# Patient Record
Sex: FEMALE | Race: OTHER | HISPANIC OR LATINO | URBAN - METROPOLITAN AREA
[De-identification: names, ages, dates, MRNs, and addresses within clinical notes are randomized per-mention and may not be internally consistent; named-entity substitution may affect disease eponyms.]

---

## 2018-03-24 ENCOUNTER — EMERGENCY (EMERGENCY)
Facility: HOSPITAL | Age: 38
LOS: 1 days | Discharge: HOME | End: 2018-03-24
Attending: EMERGENCY MEDICINE
Payer: MEDICAID

## 2018-03-24 VITALS
HEART RATE: 90 BPM | RESPIRATION RATE: 20 BRPM | SYSTOLIC BLOOD PRESSURE: 121 MMHG | OXYGEN SATURATION: 97 % | TEMPERATURE: 98 F | DIASTOLIC BLOOD PRESSURE: 80 MMHG

## 2018-03-24 PROCEDURE — 99284 EMERGENCY DEPT VISIT MOD MDM: CPT | Mod: 25

## 2018-03-24 RX ORDER — DIPHENHYDRAMINE HCL 50 MG
50 CAPSULE ORAL ONCE
Qty: 0 | Refills: 0 | Status: COMPLETED | OUTPATIENT
Start: 2018-03-24 | End: 2018-03-24

## 2018-03-24 RX ORDER — FAMOTIDINE 10 MG/ML
20 INJECTION INTRAVENOUS ONCE
Qty: 0 | Refills: 0 | Status: COMPLETED | OUTPATIENT
Start: 2018-03-24 | End: 2018-03-24

## 2018-03-24 NOTE — ED PROVIDER NOTE - ENMT, MLM
Airway patent, Nasal mucosa clear. Mouth with normal mucosa. Throat has no vesicles, no oropharyngeal exudates and uvula is midline. No oral involvement, no pharyngeal or uvula edema, no hoarse voice, tolerating secretions

## 2018-03-24 NOTE — ED PROVIDER NOTE - OBJECTIVE STATEMENT
This is a 38 year old female presenting to the ED c/o allergic reaction today. Endorses itchy rash to bilateral upper extremities and chest, unknown cause. Pt states she has experienced similar rash in the past at different areas of her body. Pt was evaluated at clinic 4 days ago and given shot of medicine but today sx returned, worse. Denies throat tightness, fever, chills, abd pain, n/v/d. Took Advil PTA. No further complaints at this time. , Matt, used to obtain history.

## 2018-03-25 VITALS
RESPIRATION RATE: 20 BRPM | OXYGEN SATURATION: 98 % | TEMPERATURE: 98 F | DIASTOLIC BLOOD PRESSURE: 75 MMHG | HEART RATE: 88 BPM | SYSTOLIC BLOOD PRESSURE: 120 MMHG

## 2018-03-25 PROCEDURE — 96375 TX/PRO/DX INJ NEW DRUG ADDON: CPT

## 2018-03-25 PROCEDURE — T1013: CPT

## 2018-03-25 PROCEDURE — 99284 EMERGENCY DEPT VISIT MOD MDM: CPT | Mod: 25

## 2018-03-25 PROCEDURE — 96374 THER/PROPH/DIAG INJ IV PUSH: CPT

## 2018-03-25 RX ORDER — DIPHENHYDRAMINE HCL 50 MG
1 CAPSULE ORAL
Qty: 8 | Refills: 0 | OUTPATIENT
Start: 2018-03-25 | End: 2018-03-26

## 2018-03-25 RX ADMIN — Medication 50 MILLIGRAM(S): at 00:54

## 2018-03-25 RX ADMIN — Medication 125 MILLIGRAM(S): at 00:53

## 2018-03-25 RX ADMIN — FAMOTIDINE 20 MILLIGRAM(S): 10 INJECTION INTRAVENOUS at 00:54

## 2018-03-25 NOTE — ED ADULT NURSE NOTE - OBJECTIVE STATEMENT
Received patient in CDU3R, a&ox4, able to make needs known. Patient reports of itchy rash to BUE and chest, unknown cause. Patient denies sob, throat tightness, itchiness and chest pain, n/v/d at this time. Medications given as MD order. Patient not in respiratory distress. To continue to monitor.

## 2018-06-13 ENCOUNTER — INPATIENT (INPATIENT)
Facility: HOSPITAL | Age: 38
LOS: 4 days | Discharge: HOME | DRG: 872 | End: 2018-06-18
Attending: HOSPITALIST | Admitting: HOSPITALIST
Payer: MEDICAID

## 2018-06-13 VITALS
DIASTOLIC BLOOD PRESSURE: 69 MMHG | HEIGHT: 61 IN | WEIGHT: 169.98 LBS | TEMPERATURE: 98 F | RESPIRATION RATE: 20 BRPM | SYSTOLIC BLOOD PRESSURE: 89 MMHG | HEART RATE: 126 BPM | OXYGEN SATURATION: 99 %

## 2018-06-13 DIAGNOSIS — N39.0 URINARY TRACT INFECTION, SITE NOT SPECIFIED: ICD-10-CM

## 2018-06-13 LAB
ALBUMIN SERPL ELPH-MCNC: 4.2 G/DL — SIGNIFICANT CHANGE UP (ref 3.3–5.2)
ALP SERPL-CCNC: 90 U/L — SIGNIFICANT CHANGE UP (ref 40–120)
ALT FLD-CCNC: 24 U/L — SIGNIFICANT CHANGE UP
ANION GAP SERPL CALC-SCNC: 15 MMOL/L — SIGNIFICANT CHANGE UP (ref 5–17)
APPEARANCE UR: CLEAR — SIGNIFICANT CHANGE UP
APTT BLD: 25.3 SEC — LOW (ref 27.5–37.4)
AST SERPL-CCNC: 32 U/L — HIGH
BACTERIA # UR AUTO: ABNORMAL
BILIRUB SERPL-MCNC: <0.2 MG/DL — LOW (ref 0.4–2)
BILIRUB UR-MCNC: ABNORMAL
BUN SERPL-MCNC: 15 MG/DL — SIGNIFICANT CHANGE UP (ref 8–20)
CALCIUM SERPL-MCNC: 9.4 MG/DL — SIGNIFICANT CHANGE UP (ref 8.6–10.2)
CHLORIDE SERPL-SCNC: 98 MMOL/L — SIGNIFICANT CHANGE UP (ref 98–107)
CO2 SERPL-SCNC: 25 MMOL/L — SIGNIFICANT CHANGE UP (ref 22–29)
COLOR SPEC: ABNORMAL
COMMENT - URINE: SIGNIFICANT CHANGE UP
CREAT SERPL-MCNC: 0.65 MG/DL — SIGNIFICANT CHANGE UP (ref 0.5–1.3)
DIFF PNL FLD: NEGATIVE — SIGNIFICANT CHANGE UP
EPI CELLS # UR: SIGNIFICANT CHANGE UP
GLUCOSE SERPL-MCNC: 125 MG/DL — HIGH (ref 70–115)
GLUCOSE UR QL: NEGATIVE MG/DL — SIGNIFICANT CHANGE UP
HCT VFR BLD CALC: 41.1 % — SIGNIFICANT CHANGE UP (ref 37–47)
HGB BLD-MCNC: 13.3 G/DL — SIGNIFICANT CHANGE UP (ref 12–16)
INR BLD: 1.03 RATIO — SIGNIFICANT CHANGE UP (ref 0.88–1.16)
KETONES UR-MCNC: NEGATIVE — SIGNIFICANT CHANGE UP
LACTATE BLDV-MCNC: 1.6 MMOL/L — SIGNIFICANT CHANGE UP (ref 0.5–2)
LEUKOCYTE ESTERASE UR-ACNC: ABNORMAL
LIDOCAIN IGE QN: 23 U/L — SIGNIFICANT CHANGE UP (ref 22–51)
LYMPHOCYTES # BLD AUTO: 6 % — LOW (ref 20–55)
MAGNESIUM SERPL-MCNC: 1.5 MG/DL — LOW (ref 1.6–2.6)
MCHC RBC-ENTMCNC: 30 PG — SIGNIFICANT CHANGE UP (ref 27–31)
MCHC RBC-ENTMCNC: 32.4 G/DL — SIGNIFICANT CHANGE UP (ref 32–36)
MCV RBC AUTO: 92.6 FL — SIGNIFICANT CHANGE UP (ref 81–99)
MONOCYTES NFR BLD AUTO: 2 % — LOW (ref 3–10)
NEUTROPHILS NFR BLD AUTO: 90 % — HIGH (ref 37–73)
NEUTS BAND # BLD: 2 % — SIGNIFICANT CHANGE UP (ref 0–8)
NITRITE UR-MCNC: POSITIVE
NT-PROBNP SERPL-SCNC: 19 PG/ML — SIGNIFICANT CHANGE UP (ref 0–300)
PH UR: 6.5 — SIGNIFICANT CHANGE UP (ref 5–8)
PLAT MORPH BLD: NORMAL — SIGNIFICANT CHANGE UP
PLATELET # BLD AUTO: 221 K/UL — SIGNIFICANT CHANGE UP (ref 150–400)
POTASSIUM SERPL-MCNC: 3.8 MMOL/L — SIGNIFICANT CHANGE UP (ref 3.5–5.3)
POTASSIUM SERPL-SCNC: 3.8 MMOL/L — SIGNIFICANT CHANGE UP (ref 3.5–5.3)
PROT SERPL-MCNC: 7.8 G/DL — SIGNIFICANT CHANGE UP (ref 6.6–8.7)
PROT UR-MCNC: 30 MG/DL
PROTHROM AB SERPL-ACNC: 11.3 SEC — SIGNIFICANT CHANGE UP (ref 9.8–12.7)
RBC # BLD: 4.44 M/UL — SIGNIFICANT CHANGE UP (ref 4.4–5.2)
RBC # FLD: 13.9 % — SIGNIFICANT CHANGE UP (ref 11–15.6)
RBC BLD AUTO: NORMAL — SIGNIFICANT CHANGE UP
RBC CASTS # UR COMP ASSIST: SIGNIFICANT CHANGE UP /HPF (ref 0–4)
SODIUM SERPL-SCNC: 138 MMOL/L — SIGNIFICANT CHANGE UP (ref 135–145)
SP GR SPEC: 1.01 — SIGNIFICANT CHANGE UP (ref 1.01–1.02)
TROPONIN T SERPL-MCNC: <0.01 NG/ML — SIGNIFICANT CHANGE UP (ref 0–0.06)
TSH SERPL-MCNC: 2.58 UIU/ML — SIGNIFICANT CHANGE UP (ref 0.27–4.2)
UROBILINOGEN FLD QL: 8 MG/DL
WBC # BLD: 8.9 K/UL — SIGNIFICANT CHANGE UP (ref 4.8–10.8)
WBC # FLD AUTO: 8.9 K/UL — SIGNIFICANT CHANGE UP (ref 4.8–10.8)
WBC UR QL: ABNORMAL

## 2018-06-13 PROCEDURE — 71275 CT ANGIOGRAPHY CHEST: CPT | Mod: 26

## 2018-06-13 PROCEDURE — 71045 X-RAY EXAM CHEST 1 VIEW: CPT | Mod: 26

## 2018-06-13 PROCEDURE — 99285 EMERGENCY DEPT VISIT HI MDM: CPT

## 2018-06-13 RX ORDER — IBUPROFEN 200 MG
600 TABLET ORAL ONCE
Qty: 0 | Refills: 0 | Status: COMPLETED | OUTPATIENT
Start: 2018-06-13 | End: 2018-06-13

## 2018-06-13 RX ORDER — AZITHROMYCIN 500 MG/1
500 TABLET, FILM COATED ORAL ONCE
Qty: 0 | Refills: 0 | Status: COMPLETED | OUTPATIENT
Start: 2018-06-13 | End: 2018-06-13

## 2018-06-13 RX ORDER — ACETAMINOPHEN 500 MG
650 TABLET ORAL ONCE
Qty: 0 | Refills: 0 | Status: COMPLETED | OUTPATIENT
Start: 2018-06-13 | End: 2018-06-13

## 2018-06-13 RX ORDER — CEFTRIAXONE 500 MG/1
1 INJECTION, POWDER, FOR SOLUTION INTRAMUSCULAR; INTRAVENOUS ONCE
Qty: 0 | Refills: 0 | Status: COMPLETED | OUTPATIENT
Start: 2018-06-13 | End: 2018-06-13

## 2018-06-13 RX ORDER — SODIUM CHLORIDE 9 MG/ML
2500 INJECTION INTRAMUSCULAR; INTRAVENOUS; SUBCUTANEOUS ONCE
Qty: 0 | Refills: 0 | Status: COMPLETED | OUTPATIENT
Start: 2018-06-13 | End: 2018-06-13

## 2018-06-13 RX ADMIN — AZITHROMYCIN 255 MILLIGRAM(S): 500 TABLET, FILM COATED ORAL at 18:17

## 2018-06-13 RX ADMIN — SODIUM CHLORIDE 2500 MILLILITER(S): 9 INJECTION INTRAMUSCULAR; INTRAVENOUS; SUBCUTANEOUS at 17:50

## 2018-06-13 RX ADMIN — Medication 600 MILLIGRAM(S): at 19:26

## 2018-06-13 RX ADMIN — Medication 650 MILLIGRAM(S): at 18:56

## 2018-06-13 RX ADMIN — Medication 600 MILLIGRAM(S): at 18:56

## 2018-06-13 RX ADMIN — CEFTRIAXONE 100 GRAM(S): 500 INJECTION, POWDER, FOR SOLUTION INTRAMUSCULAR; INTRAVENOUS at 18:17

## 2018-06-13 NOTE — ED ADULT NURSE NOTE - OBJECTIVE STATEMENT
pt c/o chest pain and back pain since today pt states that it reoccurred from three years ago. pt presently awake and alert resp even and unlabored. pt is tachycardic on monitor.

## 2018-06-13 NOTE — ED ADULT NURSE REASSESSMENT NOTE - NS ED NURSE REASSESS COMMENT FT1
pt returned from CT scan, pt c/o of being SOB with pain when breathing in. pt has family at bedside. NS hanging through patent IV.  Vital signs recorded

## 2018-06-13 NOTE — ED ADULT NURSE REASSESSMENT NOTE - NS ED NURSE REASSESS COMMENT FT1
assumed care of patient. pt alert and oriented x4. pt c/o right chest pain 3/10. pt has NS running through right IV AC. VSS, afebrile. Pt resting in bed comfortably will continue to monitor

## 2018-06-13 NOTE — ED ADULT TRIAGE NOTE - CHIEF COMPLAINT QUOTE
Patient states that she is having midsternal chest pain that started today. Patient states that the pain is non radiating and sharp. Patient c/o some SOB and back pain. Sent from clinic.

## 2018-06-13 NOTE — ED PROVIDER NOTE - MUSCULOSKELETAL, MLM
Spine appears normal, range of motion is not limited, no muscle or joint tenderness Spine appears normal, range of motion is not limited, no muscle or joint tenderness No cva ttp

## 2018-06-13 NOTE — ED PROVIDER NOTE - CONSTITUTIONAL, MLM
normal... Well appearing, well nourished, awake, alert, oriented to person, place, time/situation and in no apparent distress. Well appearing, well nourished, awake, alert, oriented to person, place, time/situation. Febrile.

## 2018-06-13 NOTE — ED PROVIDER NOTE - CARE PLAN
Principal Discharge DX:	UTI (urinary tract infection)  Secondary Diagnosis:	SIRS (systemic inflammatory response syndrome)

## 2018-06-13 NOTE — ED PROVIDER NOTE - OBJECTIVE STATEMENT
37 y/o female pt with PMHx bronchitis presents to the ED c/o chest pain. Pt describes chest pain as sharp. Associated symptoms include back pain, difficulty breathing, and cough. Denies fever, burning urination, vomiting, or abd pain. No further complaints at this time. 37 y/o female pt with PMHx bronchitis presents to the ED c/o chest pain. Pt describes chest pain as sharp. Associated symptoms include back pain, difficulty breathing, and cough. Denies fever, burning urination, vomiting, or abd pain. No further complaints at this time.  Patient found to be tachy and febrile, code sepsis initiated

## 2018-06-14 LAB
ALBUMIN SERPL ELPH-MCNC: 3 G/DL — LOW (ref 3.3–5.2)
ALP SERPL-CCNC: 62 U/L — SIGNIFICANT CHANGE UP (ref 40–120)
ALT FLD-CCNC: 31 U/L — SIGNIFICANT CHANGE UP
ANION GAP SERPL CALC-SCNC: 10 MMOL/L — SIGNIFICANT CHANGE UP (ref 5–17)
AST SERPL-CCNC: 29 U/L — SIGNIFICANT CHANGE UP
BILIRUB SERPL-MCNC: 0.3 MG/DL — LOW (ref 0.4–2)
BUN SERPL-MCNC: 10 MG/DL — SIGNIFICANT CHANGE UP (ref 8–20)
CALCIUM SERPL-MCNC: 7.5 MG/DL — LOW (ref 8.6–10.2)
CHLORIDE SERPL-SCNC: 105 MMOL/L — SIGNIFICANT CHANGE UP (ref 98–107)
CO2 SERPL-SCNC: 22 MMOL/L — SIGNIFICANT CHANGE UP (ref 22–29)
CREAT SERPL-MCNC: 0.59 MG/DL — SIGNIFICANT CHANGE UP (ref 0.5–1.3)
CULTURE RESULTS: SIGNIFICANT CHANGE UP
GLUCOSE SERPL-MCNC: 118 MG/DL — HIGH (ref 70–115)
POTASSIUM SERPL-MCNC: 3.9 MMOL/L — SIGNIFICANT CHANGE UP (ref 3.5–5.3)
POTASSIUM SERPL-SCNC: 3.9 MMOL/L — SIGNIFICANT CHANGE UP (ref 3.5–5.3)
PROT SERPL-MCNC: 5.6 G/DL — LOW (ref 6.6–8.7)
SODIUM SERPL-SCNC: 137 MMOL/L — SIGNIFICANT CHANGE UP (ref 135–145)
SPECIMEN SOURCE: SIGNIFICANT CHANGE UP
TROPONIN T SERPL-MCNC: <0.01 NG/ML — SIGNIFICANT CHANGE UP (ref 0–0.06)

## 2018-06-14 PROCEDURE — 74176 CT ABD & PELVIS W/O CONTRAST: CPT | Mod: 26

## 2018-06-14 RX ORDER — ONDANSETRON 8 MG/1
4 TABLET, FILM COATED ORAL
Qty: 0 | Refills: 0 | Status: DISCONTINUED | OUTPATIENT
Start: 2018-06-14 | End: 2018-06-17

## 2018-06-14 RX ORDER — ACETAMINOPHEN 500 MG
650 TABLET ORAL EVERY 6 HOURS
Qty: 0 | Refills: 0 | Status: DISCONTINUED | OUTPATIENT
Start: 2018-06-14 | End: 2018-06-17

## 2018-06-14 RX ORDER — SODIUM CHLORIDE 9 MG/ML
1000 INJECTION INTRAMUSCULAR; INTRAVENOUS; SUBCUTANEOUS
Qty: 0 | Refills: 0 | Status: DISCONTINUED | OUTPATIENT
Start: 2018-06-14 | End: 2018-06-14

## 2018-06-14 RX ORDER — SODIUM CHLORIDE 9 MG/ML
1000 INJECTION, SOLUTION INTRAVENOUS
Qty: 0 | Refills: 0 | Status: DISCONTINUED | OUTPATIENT
Start: 2018-06-14 | End: 2018-06-15

## 2018-06-14 RX ORDER — SODIUM CHLORIDE 9 MG/ML
1000 INJECTION INTRAMUSCULAR; INTRAVENOUS; SUBCUTANEOUS ONCE
Qty: 0 | Refills: 0 | Status: COMPLETED | OUTPATIENT
Start: 2018-06-14 | End: 2018-06-14

## 2018-06-14 RX ORDER — SACCHAROMYCES BOULARDII 250 MG
250 POWDER IN PACKET (EA) ORAL
Qty: 0 | Refills: 0 | Status: DISCONTINUED | OUTPATIENT
Start: 2018-06-14 | End: 2018-06-15

## 2018-06-14 RX ORDER — LANOLIN ALCOHOL/MO/W.PET/CERES
3 CREAM (GRAM) TOPICAL AT BEDTIME
Qty: 0 | Refills: 0 | Status: DISCONTINUED | OUTPATIENT
Start: 2018-06-14 | End: 2018-06-15

## 2018-06-14 RX ORDER — MAGNESIUM SULFATE 500 MG/ML
2 VIAL (ML) INJECTION ONCE
Qty: 0 | Refills: 0 | Status: COMPLETED | OUTPATIENT
Start: 2018-06-14 | End: 2018-06-14

## 2018-06-14 RX ORDER — CEFTRIAXONE 500 MG/1
1 INJECTION, POWDER, FOR SOLUTION INTRAMUSCULAR; INTRAVENOUS EVERY 24 HOURS
Qty: 0 | Refills: 0 | Status: DISCONTINUED | OUTPATIENT
Start: 2018-06-14 | End: 2018-06-15

## 2018-06-14 RX ORDER — ALBUTEROL 90 UG/1
2.5 AEROSOL, METERED ORAL EVERY 4 HOURS
Qty: 0 | Refills: 0 | Status: DISCONTINUED | OUTPATIENT
Start: 2018-06-14 | End: 2018-06-14

## 2018-06-14 RX ORDER — ALBUTEROL 90 UG/1
2.5 AEROSOL, METERED ORAL EVERY 6 HOURS
Qty: 0 | Refills: 0 | Status: DISCONTINUED | OUTPATIENT
Start: 2018-06-14 | End: 2018-06-18

## 2018-06-14 RX ORDER — KETOROLAC TROMETHAMINE 30 MG/ML
15 SYRINGE (ML) INJECTION ONCE
Qty: 0 | Refills: 0 | Status: DISCONTINUED | OUTPATIENT
Start: 2018-06-14 | End: 2018-06-14

## 2018-06-14 RX ORDER — ALBUTEROL 90 UG/1
2 AEROSOL, METERED ORAL
Qty: 0 | Refills: 0 | COMMUNITY

## 2018-06-14 RX ADMIN — Medication 250 MILLIGRAM(S): at 17:57

## 2018-06-14 RX ADMIN — Medication 250 MILLIGRAM(S): at 05:18

## 2018-06-14 RX ADMIN — Medication 3 MILLIGRAM(S): at 21:49

## 2018-06-14 RX ADMIN — Medication 50 GRAM(S): at 03:30

## 2018-06-14 RX ADMIN — Medication 650 MILLIGRAM(S): at 08:08

## 2018-06-14 RX ADMIN — Medication 15 MILLIGRAM(S): at 05:38

## 2018-06-14 RX ADMIN — Medication 650 MILLIGRAM(S): at 21:49

## 2018-06-14 RX ADMIN — Medication 650 MILLIGRAM(S): at 14:55

## 2018-06-14 RX ADMIN — ONDANSETRON 4 MILLIGRAM(S): 8 TABLET, FILM COATED ORAL at 21:49

## 2018-06-14 RX ADMIN — ONDANSETRON 4 MILLIGRAM(S): 8 TABLET, FILM COATED ORAL at 17:57

## 2018-06-14 RX ADMIN — CEFTRIAXONE 100 GRAM(S): 500 INJECTION, POWDER, FOR SOLUTION INTRAMUSCULAR; INTRAVENOUS at 17:57

## 2018-06-14 RX ADMIN — SODIUM CHLORIDE 2000 MILLILITER(S): 9 INJECTION INTRAMUSCULAR; INTRAVENOUS; SUBCUTANEOUS at 05:36

## 2018-06-14 RX ADMIN — Medication 650 MILLIGRAM(S): at 22:49

## 2018-06-14 RX ADMIN — Medication 650 MILLIGRAM(S): at 08:50

## 2018-06-14 RX ADMIN — SODIUM CHLORIDE 200 MILLILITER(S): 9 INJECTION, SOLUTION INTRAVENOUS at 17:57

## 2018-06-14 NOTE — ED ADULT NURSE REASSESSMENT NOTE - NS ED NURSE REASSESS COMMENT FT1
Spoke with Dr. Tobar aware of patient vital signs and assessment findings will come to assess patient; no additional fluid resuscitation at this time.

## 2018-06-14 NOTE — ED ADULT NURSE REASSESSMENT NOTE - NS ED NURSE REASSESS COMMENT FT1
PAtient received awake alert and oriented x3; Wolof speaking. offers no complaints. hypotensive/tachycardiac; patient asymptomatic. moves all extremities. slight lower extremity edema; non pitting. fluids in progress. will call md regarding bp.

## 2018-06-14 NOTE — PROGRESS NOTE ADULT - SUBJECTIVE AND OBJECTIVE BOX
INTERVAL HPI/OVERNIGHT EVENTS:  Patient is a 38y Female admitted with chief complaint of Chest pain, back pain & abdominal pain (14 Jun 2018 01:21)    Patient seen and examined at bedside, No acute overnight events. Patient ambulating, tolerating PO, voiding & stooling without any difficulties.      ROS: denies fever, chills, chest pain, SOB, abdominal pain, diarrhea, calf pain.      Allergies    No Known Drug Allergies  pork (Rash)    Intolerances    Vital Signs Last 24 Hrs  T(C): 36.6 (14 Jun 2018 05:41), Max: 38.4 (13 Jun 2018 17:41)  T(F): 97.9 (14 Jun 2018 05:41), Max: 101.2 (13 Jun 2018 17:41)  HR: 107 (14 Jun 2018 05:41) (107 - 132)  BP: 117/68 (14 Jun 2018 05:41) (89/67 - 117/68)  BP(mean): --  RR: 18 (14 Jun 2018 05:41) (18 - 20)  SpO2: 97% (14 Jun 2018 05:41) (96% - 100%)    Physical Exam:   GENERAL: well-groomed, well-developed, NAD  HEENT: head NC/AT; EOM intact   RESPIRATORY: CTA B/L, no wheezing, rales, rhonchi or rubs  CARDIOVASCULAR: S1&S2, RRR, no murmurs or gallops  ABDOMEN: soft, B/L flank tenderness (L>R), non-distended, BS+, no hernias, no hepatosplenomegaly, B/L CVA tenderness (L>R)  MUSCULOSKELETAL: n no clubbing, cyanosis or edema of extremities, no calf tenderness   SKIN: No rashes, bruises or scars   NEUROLOGIC: AA&O X3     Labs:                        13.3   8.9   )-----------( 221      ( 13 Jun 2018 17:43 )             41.1   06-13    138  |  98  |  15.0  ----------------------------<  125<H>  3.8   |  25.0  |  0.65    Ca    9.4      13 Jun 2018 17:43  Mg     1.5     06-13    TPro  7.8  /  Alb  4.2  /  TBili  <0.2<L>  /  DBili  x   /  AST  32<H>  /  ALT  24  /  AlkPhos  90  06-13    PT/INR - ( 13 Jun 2018 17:43 )   PT: 11.3 sec;   INR: 1.03 ratio         PTT - ( 13 Jun 2018 17:43 )  PTT:25.3 sec  CAPILLARY BLOOD GLUCOSE        Radiology:  < from: CT Renal Stone Hunt (06.14.18 @ 00:21) >  IMPRESSION:         Images are somewhat degraded by artifact from patient motion.      Cannot exclude renal collecting system stones as the exam was performed   after   IV contrast administration and there is dense contrast in the urinary   tract   collecting system. No hydronephrosis or hydroureter.      Nonspecific small groin and external iliac chain lymph nodes.      Colonic diverticulosis.    < end of copied text >      Medications:  MEDICATIONS  (STANDING):  cefTRIAXone   IVPB 1 Gram(s) IV Intermittent every 24 hours  saccharomyces boulardii 250 milliGRAM(s) Oral two times a day  sodium chloride 0.9%. 1000 milliLiter(s) (125 mL/Hr) IV Continuous <Continuous>    MEDICATIONS  (PRN):  acetaminophen   Tablet 650 milliGRAM(s) Oral every 6 hours PRN For Temp greater than 38 C (100.4 F)  acetaminophen   Tablet. 650 milliGRAM(s) Oral every 6 hours PRN Moderate Pain (4 - 6)  ALBUTerol    0.083% 2.5 milliGRAM(s) Nebulizer every 6 hours PRN Shortness of Breath and/or Wheezing

## 2018-06-14 NOTE — ED ADULT NURSE REASSESSMENT NOTE - NS ED NURSE REASSESS COMMENT FT1
pt A&ox4 c/o headache .. medicated as ordered will continue to monitor, resp even and unlabored no distress noted, cardiac monitor in place, VSS as documented, pt ambulated without difficulty, will continue to monitor

## 2018-06-14 NOTE — H&P ADULT - HISTORY OF PRESENT ILLNESS
39 y/o F w/ PMH bronchitis & HLD presents w/ chest pain, back pain & abdominal pain. Pt had chest pain this morning so she went to the clinic to get checked out. She was subsequently sent to ED for further evaluation. She described the pain as sharp, epigastric, 8/10, intermittent, non-radiating, exacerbated w/ movement, alleviated w/ rest, non-pruritic & associated w/ B/L back pain (L>R), B/L flank pain (L>R), N/V (non-bloody nonbilious) & dysuria. Patient denies of headache, fever, chills, palpitations, SOB, constipation, diarrhea, hematuria, recent travel, LE swelling, calf tenderness or sick contacts. No similar episodes in the past.

## 2018-06-14 NOTE — H&P ADULT - NSHPPHYSICALEXAM_GEN_ALL_CORE
T(F): 101.2  HR: 132  BP: 116/71  RR: 18  SpO2: 100% RA    Physical Exam:   GENERAL: well-groomed, well-developed, NAD  HEENT: head NC/AT; EOM intact, PERRLA, conjunctiva & sclera clear; hearing grossly intact ; no nasal congestion or discharge, no sinus tenderness, no tonsillar erythema or exudates, dry mucous membranes   NECK: supple, no JVD, no thyromegaly  RESPIRATORY: CTA B/L, no wheezing, rales, rhonchi or rubs  CARDIOVASCULAR: S1&S2, RRR, no murmurs or gallops  ABDOMEN: soft, B/L flank tenderness (L>R), non-distended, BS+, no hernias, no hepatosplenomegaly, B/L CVA tenderness (L>R)  MUSCULOSKELETAL: no muscle atrophy, no clubbing, cyanosis or edema of extremities, no calf tenderness   LYMPH: no lymphadenopathy  VASCULAR: peripheral pulses 2+, capillary refill < 2 seconds, no varicose veins   SKIN: No rashes, bruises or scars   NEUROLOGIC: AA&O X3, CN2-12 intact w/ no focal deficits, no sensory loss, motor Strength 5/5 in UE & LE B/L

## 2018-06-14 NOTE — H&P ADULT - NSHPREVIEWOFSYSTEMS_GEN_ALL_CORE
positive: chest pain, B/L back pain (L>R), B/L flank pain (L>R), N/V (non-bloody nonbilious) & dysuria.  negative: headache, fever, chills, palpitations, SOB, constipation, diarrhea, hematuria, recent travel, LE swelling, calf tenderness or sick contacts.

## 2018-06-14 NOTE — H&P ADULT - ASSESSMENT
39 y/o F w/ PMH bronchitis & HLD presents w/ chest pain, back pain & abdominal pain admitted for pyelonephritis. 39 y/o F w/ PMH bronchitis & HLD presents w/ chest pain, back pain & abdominal pain admitted for pyelonephritis. CTA negative for PE.     Pyelonephritis  >Admit to medicine-resident service under Dr. Mares  >Bed: any  >Diet: DASH/TLC  >Activity: ambulate as tolerated  >Nursing: vitals per routine  >IV fluids: s/p 2.5 L NS in ED; now on NS @ 125 cc/hr  >Antibiotics: ceftriaxone w/ florastor  >Labs: CBC, CMP, PT/INR, PTT, Mg, Phos, UA, blood & urine cultures  >Imaging: chest CTA negative for PE, abdominal CT checking for renal stones pending  >Consults: may consider urology consult if there's hydronephrosis or large renal stones    Bronchitis  >stable  >albuterol Q6H PRN bronchospasm and/or SOB    DVT prophylaxis   >VCD boots 39 y/o F w/ PMH bronchitis & HLD presents w/ chest pain, back pain & abdominal pain admitted for pyelonephritis. CTA negative for PE.     Pyelonephritis  >Admit to medicine-resident service under Dr. Mares  >Bed: any  >Diet: DASH/TLC  >Activity: ambulate as tolerated  >Nursing: vitals per routine  >IV fluids: s/p 2.5 L NS in ED; now on NS @ 125 cc/hr  >Antibiotics: ceftriaxone w/ florastor  >Labs: CBC, CMP, PT/INR, PTT, Mg, Phos, UA, blood & urine cultures  >Imaging: chest CTA negative for PE, abdominal CT checking for renal stones pending  >Consults: may consider urology consult if there's hydronephrosis or large renal stones    Hypomagnesemia  >Mg 1.5; repleted  >f/u AM labs     Elevated LFTs  >f/u  AM labs   >will peruse further workup if LFTs remain persistently elevated      Bronchitis  >stable  >albuterol Q6H PRN bronchospasm and/or SOB    HLD  >diet controlled; c/w DASH/TLC    DVT prophylaxis   >VCD boots

## 2018-06-14 NOTE — PROGRESS NOTE ADULT - ASSESSMENT
37 y/o F w/ PMH bronchitis & HLD presents w/ chest pain, back pain & abdominal pain admitted for pyelonephritis. CTA negative for PE.     Pyelonephritis  >IV fluids: s/p 3.5 L NS in ED; now on NS @ 125 cc/hr  >c/w ceftriaxone w/ florastor  >f/u: blood & urine cultures  >chest CTA negative for PE  >abdominal CT negative for obstructing stones or collections but study limited by previous use of contrast; may need to repeat imaging if patient fail to respond to current therapy    Hypomagnesemia  >Mg 1.5; repleted  >AM labs pending    Elevated LFTs  >AM labs pending    >will peruse further workup if LFTs remain persistently elevated      Bronchitis  >stable  >albuterol Q6H PRN bronchospasm and/or SOB    HLD  >diet controlled; c/w DASH/TLC    Colonic diverticulosis  >incidental finding; asymptomatic   >outpatient f/u w/ GI if symptomatic     DVT prophylaxis   >VCD boots

## 2018-06-14 NOTE — ED ADULT NURSE REASSESSMENT NOTE - NS ED NURSE REASSESS COMMENT FT1
pt status unchanged, refer to flowsheet and chart, pt safety maintained, pt hemodynamically stable, report given, pending transfer

## 2018-06-15 DIAGNOSIS — T78.3XXA ANGIONEUROTIC EDEMA, INITIAL ENCOUNTER: ICD-10-CM

## 2018-06-15 DIAGNOSIS — J06.9 ACUTE UPPER RESPIRATORY INFECTION, UNSPECIFIED: ICD-10-CM

## 2018-06-15 DIAGNOSIS — D64.9 ANEMIA, UNSPECIFIED: ICD-10-CM

## 2018-06-15 DIAGNOSIS — J45.909 UNSPECIFIED ASTHMA, UNCOMPLICATED: ICD-10-CM

## 2018-06-15 DIAGNOSIS — N12 TUBULO-INTERSTITIAL NEPHRITIS, NOT SPECIFIED AS ACUTE OR CHRONIC: ICD-10-CM

## 2018-06-15 DIAGNOSIS — F32.9 MAJOR DEPRESSIVE DISORDER, SINGLE EPISODE, UNSPECIFIED: ICD-10-CM

## 2018-06-15 DIAGNOSIS — Z29.9 ENCOUNTER FOR PROPHYLACTIC MEASURES, UNSPECIFIED: ICD-10-CM

## 2018-06-15 DIAGNOSIS — R51 HEADACHE: ICD-10-CM

## 2018-06-15 DIAGNOSIS — E83.51 HYPOCALCEMIA: ICD-10-CM

## 2018-06-15 DIAGNOSIS — E83.39 OTHER DISORDERS OF PHOSPHORUS METABOLISM: ICD-10-CM

## 2018-06-15 LAB
ALBUMIN SERPL ELPH-MCNC: 2.8 G/DL — LOW (ref 3.3–5.2)
ALP SERPL-CCNC: 70 U/L — SIGNIFICANT CHANGE UP (ref 40–120)
ALT FLD-CCNC: 43 U/L — HIGH
ANION GAP SERPL CALC-SCNC: 11 MMOL/L — SIGNIFICANT CHANGE UP (ref 5–17)
AST SERPL-CCNC: 33 U/L — HIGH
BILIRUB SERPL-MCNC: <0.2 MG/DL — LOW (ref 0.4–2)
BUN SERPL-MCNC: 4 MG/DL — LOW (ref 8–20)
CALCIUM SERPL-MCNC: 7.5 MG/DL — LOW (ref 8.6–10.2)
CHLORIDE SERPL-SCNC: 109 MMOL/L — HIGH (ref 98–107)
CO2 SERPL-SCNC: 21 MMOL/L — LOW (ref 22–29)
CREAT SERPL-MCNC: 0.4 MG/DL — LOW (ref 0.5–1.3)
EOSINOPHIL # BLD AUTO: 0.9 K/UL — HIGH (ref 0–0.5)
EOSINOPHIL NFR BLD AUTO: 6.9 % — HIGH (ref 0–6)
GLUCOSE SERPL-MCNC: 121 MG/DL — HIGH (ref 70–115)
HCT VFR BLD CALC: 30.2 % — LOW (ref 37–47)
HGB BLD-MCNC: 9.9 G/DL — LOW (ref 12–16)
LYMPHOCYTES # BLD AUTO: 0.8 K/UL — LOW (ref 1–4.8)
LYMPHOCYTES # BLD AUTO: 6.2 % — LOW (ref 20–55)
MAGNESIUM SERPL-MCNC: 2.3 MG/DL — SIGNIFICANT CHANGE UP (ref 1.8–2.6)
MCHC RBC-ENTMCNC: 29.6 PG — SIGNIFICANT CHANGE UP (ref 27–31)
MCHC RBC-ENTMCNC: 32.8 G/DL — SIGNIFICANT CHANGE UP (ref 32–36)
MCV RBC AUTO: 90.1 FL — SIGNIFICANT CHANGE UP (ref 81–99)
MONOCYTES # BLD AUTO: 0.7 K/UL — SIGNIFICANT CHANGE UP (ref 0–0.8)
MONOCYTES NFR BLD AUTO: 5.3 % — SIGNIFICANT CHANGE UP (ref 3–10)
NEUTROPHILS # BLD AUTO: 10.2 K/UL — HIGH (ref 1.8–8)
NEUTROPHILS NFR BLD AUTO: 81.4 % — HIGH (ref 37–73)
PHOSPHATE SERPL-MCNC: 1.8 MG/DL — LOW (ref 2.4–4.7)
PLATELET # BLD AUTO: 151 K/UL — SIGNIFICANT CHANGE UP (ref 150–400)
POTASSIUM SERPL-MCNC: 3.8 MMOL/L — SIGNIFICANT CHANGE UP (ref 3.5–5.3)
POTASSIUM SERPL-SCNC: 3.8 MMOL/L — SIGNIFICANT CHANGE UP (ref 3.5–5.3)
PROT SERPL-MCNC: 5.3 G/DL — LOW (ref 6.6–8.7)
RBC # BLD: 3.35 M/UL — LOW (ref 4.4–5.2)
RBC # FLD: 14.4 % — SIGNIFICANT CHANGE UP (ref 11–15.6)
SODIUM SERPL-SCNC: 141 MMOL/L — SIGNIFICANT CHANGE UP (ref 135–145)
WBC # BLD: 12.6 K/UL — HIGH (ref 4.8–10.8)
WBC # FLD AUTO: 12.6 K/UL — HIGH (ref 4.8–10.8)

## 2018-06-15 PROCEDURE — 93970 EXTREMITY STUDY: CPT | Mod: 26

## 2018-06-15 PROCEDURE — 99233 SBSQ HOSP IP/OBS HIGH 50: CPT | Mod: GC

## 2018-06-15 RX ORDER — SODIUM CHLORIDE 9 MG/ML
1000 INJECTION, SOLUTION INTRAVENOUS
Qty: 0 | Refills: 0 | Status: DISCONTINUED | OUTPATIENT
Start: 2018-06-15 | End: 2018-06-17

## 2018-06-15 RX ORDER — DIPHENHYDRAMINE HCL 50 MG
25 CAPSULE ORAL EVERY 6 HOURS
Qty: 0 | Refills: 0 | Status: DISCONTINUED | OUTPATIENT
Start: 2018-06-15 | End: 2018-06-17

## 2018-06-15 RX ORDER — POTASSIUM PHOSPHATE, MONOBASIC POTASSIUM PHOSPHATE, DIBASIC 236; 224 MG/ML; MG/ML
30 INJECTION, SOLUTION INTRAVENOUS ONCE
Qty: 0 | Refills: 0 | Status: COMPLETED | OUTPATIENT
Start: 2018-06-15 | End: 2018-06-15

## 2018-06-15 RX ORDER — BENZOCAINE AND MENTHOL 5; 1 G/100ML; G/100ML
1 LIQUID ORAL EVERY 6 HOURS
Qty: 0 | Refills: 0 | Status: DISCONTINUED | OUTPATIENT
Start: 2018-06-15 | End: 2018-06-17

## 2018-06-15 RX ORDER — DIPHENHYDRAMINE HCL 50 MG
25 CAPSULE ORAL ONCE
Qty: 0 | Refills: 0 | Status: COMPLETED | OUTPATIENT
Start: 2018-06-15 | End: 2018-06-15

## 2018-06-15 RX ORDER — MONTELUKAST 4 MG/1
10 TABLET, CHEWABLE ORAL AT BEDTIME
Qty: 0 | Refills: 0 | Status: DISCONTINUED | OUTPATIENT
Start: 2018-06-15 | End: 2018-06-17

## 2018-06-15 RX ORDER — CALCIUM CARBONATE 500(1250)
1 TABLET ORAL
Qty: 0 | Refills: 0 | Status: COMPLETED | OUTPATIENT
Start: 2018-06-15 | End: 2018-06-17

## 2018-06-15 RX ADMIN — BENZOCAINE AND MENTHOL 1 LOZENGE: 5; 1 LIQUID ORAL at 18:08

## 2018-06-15 RX ADMIN — POTASSIUM PHOSPHATE, MONOBASIC POTASSIUM PHOSPHATE, DIBASIC 83.33 MILLIMOLE(S): 236; 224 INJECTION, SOLUTION INTRAVENOUS at 09:34

## 2018-06-15 RX ADMIN — Medication 25 MILLIGRAM(S): at 18:08

## 2018-06-15 RX ADMIN — Medication 125 MILLIGRAM(S): at 12:15

## 2018-06-15 RX ADMIN — BENZOCAINE AND MENTHOL 1 LOZENGE: 5; 1 LIQUID ORAL at 12:16

## 2018-06-15 RX ADMIN — ONDANSETRON 4 MILLIGRAM(S): 8 TABLET, FILM COATED ORAL at 05:48

## 2018-06-15 RX ADMIN — SODIUM CHLORIDE 200 MILLILITER(S): 9 INJECTION, SOLUTION INTRAVENOUS at 09:35

## 2018-06-15 RX ADMIN — Medication 25 MILLIGRAM(S): at 12:16

## 2018-06-15 RX ADMIN — ONDANSETRON 4 MILLIGRAM(S): 8 TABLET, FILM COATED ORAL at 18:08

## 2018-06-15 RX ADMIN — Medication 25 MILLIGRAM(S): at 02:47

## 2018-06-15 RX ADMIN — ONDANSETRON 4 MILLIGRAM(S): 8 TABLET, FILM COATED ORAL at 12:16

## 2018-06-15 RX ADMIN — Medication 650 MILLIGRAM(S): at 08:26

## 2018-06-15 RX ADMIN — SODIUM CHLORIDE 200 MILLILITER(S): 9 INJECTION, SOLUTION INTRAVENOUS at 05:48

## 2018-06-15 RX ADMIN — Medication 650 MILLIGRAM(S): at 07:23

## 2018-06-15 RX ADMIN — Medication 1 TABLET(S): at 18:09

## 2018-06-15 RX ADMIN — MONTELUKAST 10 MILLIGRAM(S): 4 TABLET, CHEWABLE ORAL at 21:29

## 2018-06-15 RX ADMIN — Medication 250 MILLIGRAM(S): at 05:48

## 2018-06-15 NOTE — PROGRESS NOTE ADULT - PROBLEM SELECTOR PLAN 3
- supportive care  - RVP pending new occurrence, mild on admission   neurological exam intact grossly  tylenol for headache, if no improvement, will consider ct brain

## 2018-06-15 NOTE — CDI QUERY NOTE - NSCDICONTACTSINFO_GEN_ALL_CORE_HH
Marley Stevens, BOOGIE   [phone]
Marley Stevens, BOOGIE  [phone]
Marley Stevens, BOOGIE  [phone]

## 2018-06-15 NOTE — PROGRESS NOTE ADULT - ASSESSMENT
Briefly this is a 37 yo F a/w pyelonephritis.  Improving overall, but with episode of angioedema overnight - unclear causative agent. This is a 37 yo F  with a PMHx of asthma ( mild intermittent) who presented with c/o chest pain, sob and back pain, a/w pyelonephritis.  Improving overall, but with episode of angioedema overnight - unclear causative agent but likely to be due to pork consumption as she has had similar episodes in the past with pork consumption.

## 2018-06-15 NOTE — PROGRESS NOTE ADULT - SUBJECTIVE AND OBJECTIVE BOX
PGY-2 Medicine Progress Note  GAYLE THOMPSON  MRN: [id number]    Patient is a 38y old  Female who presents with a chief complaint of Chest pain, back pain & abdominal pain (14 Jun 2018 01:21)    BRIEF HOSPITAL COURSE:  Briefly this is a 39 yo F a/w pyelonephritis. Noted to have chest pain on admission with coughing, likely 2/2 URI.    Events last 24 hours:   Patient noted to have lip swelling overnight. No rashes, N/V/D, or other mucosal involvement.  Benadryl given with improvement in lip swelling.    ROS negative except as above    MEDICATIONS  (STANDING):  cefTRIAXone   IVPB 1 Gram(s) IV Intermittent every 24 hours  multiple electrolytes Injection Type 1 1000 milliLiter(s) (200 mL/Hr) IV Continuous <Continuous>  ondansetron    Tablet 4 milliGRAM(s) Oral three times a day  potassium phosphate IVPB 30 milliMole(s) IV Intermittent once  saccharomyces boulardii 250 milliGRAM(s) Oral two times a day    MEDICATIONS  (PRN):  acetaminophen   Tablet 650 milliGRAM(s) Oral every 6 hours PRN For Temp greater than 38 C (100.4 F)  acetaminophen   Tablet. 650 milliGRAM(s) Oral every 6 hours PRN Moderate Pain (4 - 6)  ALBUTerol    0.083% 2.5 milliGRAM(s) Nebulizer every 6 hours PRN Shortness of Breath and/or Wheezing      Vital Signs Last 24 Hrs  T(C): 36.8 (14 Jun 2018 18:03), Max: 38.8 (14 Jun 2018 14:51)  T(F): 98.2 (14 Jun 2018 18:03), Max: 101.9 (14 Jun 2018 14:51)  HR: 107 (14 Jun 2018 15:35) (107 - 116)  BP: 108/70 (14 Jun 2018 15:35) (91/66 - 119/75)  BP(mean): --  RR: 18 (14 Jun 2018 15:35) (16 - 18)  SpO2: 97% (14 Jun 2018 15:35) (97% - 98%)    I&O's Detail    14 Jun 2018 07:01  -  15 Gabino 2018 07:00  --------------------------------------------------------  IN:    multiple electrolytes Injection Type 1: 1400 mL  Total IN: 1400 mL    OUT:  Total OUT: 0 mL    Total NET: 1400 mL                                9.9    12.6  )-----------( 151      ( 15 Gabino 2018 04:12 )             30.2     06-15    141  |  109<H>  |  4.0<L>  ----------------------------<  121<H>  3.8   |  21.0<L>  |  0.40<L>    Ca    7.5<L>      15 Gabino 2018 04:12  Phos  1.8     06-15  Mg     2.3     06-15    TPro  5.3<L>  /  Alb  2.8<L>  /  TBili  <0.2<L>  /  DBili  x   /  AST  33<H>  /  ALT  43<H>  /  AlkPhos  70  06-15    PT/INR - ( 13 Jun 2018 17:43 )   PT: 11.3 sec;   INR: 1.03 ratio         PTT - ( 13 Jun 2018 17:43 )  PTT:25.3 sec  CARDIAC MARKERS ( 14 Jun 2018 08:16 )  x     / <0.01 ng/mL / x     / x     / x      CARDIAC MARKERS ( 13 Jun 2018 17:43 )  x     / <0.01 ng/mL / x     / x     / x          CAPILLARY BLOOD GLUCOSE          Culture - Urine (collected 13 Jun 2018 19:16)  Source: .Urine Clean Catch (Midstream)  Final Report (14 Jun 2018 16:28):    Culture grew 3 or more types of organisms which indicate    collection contamination; consider recollection only if clinically    indicated.    Spoke with Hugh Nadeen  06/14/2018 16:28:09        Physical Examination:    General: No acute distress.    HEENT: Pupils equal, reactive to light.  Symmetric. b/l injected conjunctiva improved from yesterday. Mild swelling of lips without erythema. No swelling of oral mucosa or tongue.  NECK: Supple, no lymphadenopathy, trachea midline  RESP: Clear to auscultation bilaterally, no significant sputum production  CV: Regular rate and rhythm, no murmurs, rubs, or gallops  ABD: Soft, nondistended, nontender, normoactive bowel sounds  EXT: No edema, nontender  SKIN: Warm and well perfused. Skin exam performed with nurse as chaperon, no rashes.  NEURO: Alert, oriented, interactive, nonfocal Patient is a 38y old  Female who presents with a chief complaint of Chest pain, back pain & abdominal pain (14 Jun 2018 01:21)    BRIEF HOSPITAL COURSE: Patient seen and examined at bedside. Overnight, developed angioedema with lip and eye lid swelling, sparing the tongue. Noted to have had this occur in the past when she ate pork, ate pork last night. Also c/o headache which is new.     ROS: No chest pain, palpitations, sob, difficulty breathing/cough, fevers/chills, abdominal pain, n/v, diarrhea/constipation, dysuria or increased urinary frequency. Rest of ROS negative     MEDICATIONS  (STANDING):  cefTRIAXone   IVPB 1 Gram(s) IV Intermittent every 24 hours  multiple electrolytes Injection Type 1 1000 milliLiter(s) (200 mL/Hr) IV Continuous <Continuous>  ondansetron    Tablet 4 milliGRAM(s) Oral three times a day  potassium phosphate IVPB 30 milliMole(s) IV Intermittent once  saccharomyces boulardii 250 milliGRAM(s) Oral two times a day    MEDICATIONS  (PRN):  acetaminophen   Tablet 650 milliGRAM(s) Oral every 6 hours PRN For Temp greater than 38 C (100.4 F)  acetaminophen   Tablet. 650 milliGRAM(s) Oral every 6 hours PRN Moderate Pain (4 - 6)  ALBUTerol    0.083% 2.5 milliGRAM(s) Nebulizer every 6 hours PRN Shortness of Breath and/or Wheezing    Vital Signs Last 24 Hrs  T(C): 36.8 (14 Jun 2018 18:03), Max: 38.8 (14 Jun 2018 14:51)  T(F): 98.2 (14 Jun 2018 18:03), Max: 101.9 (14 Jun 2018 14:51)  HR: 107 (14 Jun 2018 15:35) (107 - 116)  BP: 108/70 (14 Jun 2018 15:35) (91/66 - 119/75)  RR: 18 (14 Jun 2018 15:35) (16 - 18)  SpO2: 97% (14 Jun 2018 15:35) (97% - 98%)    Physical Examination:    General: No acute distress.    HEENT: Pupils equal, reactive to light.  Symmetric. b/l injected conjunctiva improved from yesterday. Mild swelling of lips without erythema. No swelling of oral mucosa or tongue. Left eye lid swelling noted.   NECK: Supple, no lymphadenopathy, trachea midline. no pharyngitis noted, no uvula edema  RESP: Clear to auscultation bilaterally, no significant sputum production  CV: Regular rate and rhythm, no murmurs, rubs, or gallops  ABD: Soft, nondistended, nontender, normoactive bowel sounds  EXT: No edema, nontender  SKIN: Warm and well perfused. Skin exam performed with nurse as chaperon, no rashes.  NEURO: Alert, oriented, interactive, nonfocal      LABS:                         9.9    12.6  )-----------( 151      ( 15 Gabino 2018 04:12 )             30.2     06-15    141  |  109<H>  |  4.0<L>  ----------------------------<  121<H>  3.8   |  21.0<L>  |  0.40<L>    Ca    7.5<L>      15 Gabino 2018 04:12  Phos  1.8     06-15  Mg     2.3     06-15    TPro  5.3<L>  /  Alb  2.8<L>  /  TBili  <0.2<L>  /  DBili  x   /  AST  33<H>  /  ALT  43<H>  /  AlkPhos  70  06-15    PT/INR - ( 13 Jun 2018 17:43 )   PT: 11.3 sec;   INR: 1.03 ratio         PTT - ( 13 Jun 2018 17:43 )  PTT:25.3 sec  CARDIAC MARKERS ( 14 Jun 2018 08:16 )  x     / <0.01 ng/mL / x     / x     / x      CARDIAC MARKERS ( 13 Jun 2018 17:43 )  x     / <0.01 ng/mL / x     / x     / x          CAPILLARY BLOOD GLUCOSE          Culture - Urine (collected 13 Jun 2018 19:16)  Source: .Urine Clean Catch (Midstream)  Final Report (14 Jun 2018 16:28):    Culture grew 3 or more types of organisms which indicate    collection contamination; consider recollection only if clinically    indicated.    Spoke with Hugh Senior  06/14/2018 16:28:09    Repeat ucx in lab, blood cx pending in lab Patient is a 38y old  Female who presents with a chief complaint of Chest pain, back pain & abdominal pain (14 Jun 2018 01:21)    BRIEF HOSPITAL COURSE: Patient seen and examined at bedside. Overnight, developed angioedema with lip and eye lid swelling, sparing the tongue. Noted to have had this occur in the past when she ate pork, ate pork last night. Also c/o headache which is new.     ROS: No chest pain, palpitations, sob, difficulty breathing/cough, fevers/chills, abdominal pain, n/v, diarrhea/constipation, dysuria or increased urinary frequency. Rest of ROS negative     MEDICATIONS  (STANDING):  cefTRIAXone   IVPB 1 Gram(s) IV Intermittent every 24 hours  multiple electrolytes Injection Type 1 1000 milliLiter(s) (200 mL/Hr) IV Continuous <Continuous>  ondansetron    Tablet 4 milliGRAM(s) Oral three times a day  potassium phosphate IVPB 30 milliMole(s) IV Intermittent once  saccharomyces boulardii 250 milliGRAM(s) Oral two times a day    MEDICATIONS  (PRN):  acetaminophen   Tablet 650 milliGRAM(s) Oral every 6 hours PRN For Temp greater than 38 C (100.4 F)  acetaminophen   Tablet. 650 milliGRAM(s) Oral every 6 hours PRN Moderate Pain (4 - 6)  ALBUTerol    0.083% 2.5 milliGRAM(s) Nebulizer every 6 hours PRN Shortness of Breath and/or Wheezing    Vital Signs Last 24 Hrs  T(C): 36.8 (14 Jun 2018 18:03), Max: 38.8 (14 Jun 2018 14:51)  T(F): 98.2 (14 Jun 2018 18:03), Max: 101.9 (14 Jun 2018 14:51)  HR: 107 (14 Jun 2018 15:35) (107 - 116)  BP: 108/70 (14 Jun 2018 15:35) (91/66 - 119/75)  RR: 18 (14 Jun 2018 15:35) (16 - 18)  SpO2: 97% (14 Jun 2018 15:35) (97% - 98%)    Physical Examination:    General: No acute distress.    HEENT: Pupils equal, reactive to light.  Symmetric. b/l injected conjunctiva improved from yesterday. Mild swelling of lips without erythema. No swelling of oral mucosa or tongue. Left eye lid swelling noted.   NECK: Supple, no lymphadenopathy, trachea midline. no pharyngitis noted, no uvula edema  RESP: Clear to auscultation bilaterally, no significant sputum production  CV: Regular rate and rhythm, no murmurs, rubs, or gallops  ABD: Soft, nondistended, nontender, normoactive bowel sounds  EXT: mild edema b/l non pitting   SKIN: Warm and well perfused. Skin exam performed with nurse as chaperon, no rashes.  NEURO: Alert, oriented, interactive, nonfocal      LABS:                         9.9    12.6  )-----------( 151      ( 15 Gabino 2018 04:12 )             30.2     06-15    141  |  109<H>  |  4.0<L>  ----------------------------<  121<H>  3.8   |  21.0<L>  |  0.40<L>    Ca    7.5<L>      15 Gabino 2018 04:12  Phos  1.8     06-15  Mg     2.3     06-15    TPro  5.3<L>  /  Alb  2.8<L>  /  TBili  <0.2<L>  /  DBili  x   /  AST  33<H>  /  ALT  43<H>  /  AlkPhos  70  06-15    PT/INR - ( 13 Jun 2018 17:43 )   PT: 11.3 sec;   INR: 1.03 ratio         PTT - ( 13 Jun 2018 17:43 )  PTT:25.3 sec  CARDIAC MARKERS ( 14 Jun 2018 08:16 )  x     / <0.01 ng/mL / x     / x     / x      CARDIAC MARKERS ( 13 Jun 2018 17:43 )  x     / <0.01 ng/mL / x     / x     / x          CAPILLARY BLOOD GLUCOSE          Culture - Urine (collected 13 Jun 2018 19:16)  Source: .Urine Clean Catch (Midstream)  Final Report (14 Jun 2018 16:28):    Culture grew 3 or more types of organisms which indicate    collection contamination; consider recollection only if clinically    indicated.    Spoke with Hugh Senior  06/14/2018 16:28:09    Repeat ucx in lab, blood cx pending in lab

## 2018-06-15 NOTE — PROVIDER CONTACT NOTE (OTHER) - SITUATION
RN called by patient to look at swollen lips/face. Pt states she is uncomfortable and the swelling came on out of no where.

## 2018-06-15 NOTE — CDI QUERY NOTE - NSCDIOTHERTXTBX_GEN_ALL_CORE_HH
Chloride Level    "electrolyte abnormalities, replaced" documented on 6/15  chloride = 109 on 6/15  IV NS @ 125/hr     Please provide a diagnosis in progress notes for above data if clinically significant.

## 2018-06-15 NOTE — CDI QUERY NOTE - NSCDISEPSISTXTBX_GEN_ALL_CORE_HH
admitted with cough, back pain found to have URI and UTI/pyelonephritis  ED = code sepsis initiated/ UTI, SIRS  H&P = clinical pyelonephritis with sepsis criteria  vs in ED = temp 101.2, 89//71, 101-132, 16-20  WBC 8.9 then 12.6 on 6/15    Please clarify, in progress notes and d/c summary, status of sepsis (see above choices).

## 2018-06-15 NOTE — PROGRESS NOTE ADULT - PROBLEM SELECTOR PLAN 9
since post partum (child is in pre k) - chronic at this point   No suicidal or homicidal ideation   -rec OP psych for anti depressant therapy

## 2018-06-15 NOTE — CDI QUERY NOTE - NSCDIOTHERTXTBX_GEN_ALL_CORE_HH
Phosphorus level    "Electrolyte abnormalities, replaced" documented 6/15  phosphorus level 1.8 on 6/15  potassium phosphate 30 mm IVPB x 1 on 6/18    Please provide a diagnosis in progress notes for above if clinically significant.

## 2018-06-15 NOTE — PROGRESS NOTE ADULT - PROBLEM SELECTOR PLAN 2
- consider changing abx  - hold any unnecessary medications  - patient with h/o asthma and environmental allergies = risk for atopy, will refer to allergist as outpatient -change class of abx   -likely due to pork consumption   -solumedrol 125mg x 1 due to lip swelling   -benadryl around the clock   -adding singulair and monitor   - hold any unnecessary medications  - patient with h/o asthma and environmental allergies = risk for atopy, will refer to allergist as outpatient -change class of abx   -likely due to pork consumption   -solumedrol 125mg x 1 due to lip swelling   -benadryl around the clock   -adding singulair and monitor   - hold any unnecessary medications  - patient with h/o asthma and environmental allergies = risk for atopy, will refer to allergist as outpatient  -LE duplex negative for dvt

## 2018-06-15 NOTE — PROGRESS NOTE ADULT - PROBLEM SELECTOR PLAN 1
- improving on ceftriaxone, although likely will switch to different class of abx as unclear causative agent of angioedema  - f/u urine and blood cx  - with electrolyte abnormalities, replaced with initial presentation meeting sepsis criteria. Leucocytosis noted, mild increase with no recurrent fevers   Sepsis resolving at this time   - improving on ceftriaxone, (day #2) although likely will switch to different class of abx as unclear causative agent of angioedema  - f/u urine and blood cx pending in lab   -c/w IVF   -pain control as needed with initial presentation meeting sepsis criteria. Leucocytosis noted, mild increase with no recurrent fevers   Sepsis resolving at this time   - improving on ceftriaxone, (day #2) although likely will switch to different class of abx as unclear causative agent of angioedema  - f/u urine and blood cx pending in lab   -c/w IVF   -pain control as needed  -elevated lfts noted likely 2/2 acute infection

## 2018-06-16 LAB
ALBUMIN SERPL ELPH-MCNC: 3.1 G/DL — LOW (ref 3.3–5.2)
ALP SERPL-CCNC: 86 U/L — SIGNIFICANT CHANGE UP (ref 40–120)
ALT FLD-CCNC: 60 U/L — HIGH
ANION GAP SERPL CALC-SCNC: 12 MMOL/L — SIGNIFICANT CHANGE UP (ref 5–17)
AST SERPL-CCNC: 25 U/L — SIGNIFICANT CHANGE UP
BASOPHILS # BLD AUTO: 0 K/UL — SIGNIFICANT CHANGE UP (ref 0–0.2)
BASOPHILS NFR BLD AUTO: 0.1 % — SIGNIFICANT CHANGE UP (ref 0–2)
BILIRUB SERPL-MCNC: <0.2 MG/DL — LOW (ref 0.4–2)
BUN SERPL-MCNC: 9 MG/DL — SIGNIFICANT CHANGE UP (ref 8–20)
CALCIUM SERPL-MCNC: 8.7 MG/DL — SIGNIFICANT CHANGE UP (ref 8.6–10.2)
CHLORIDE SERPL-SCNC: 106 MMOL/L — SIGNIFICANT CHANGE UP (ref 98–107)
CO2 SERPL-SCNC: 21 MMOL/L — LOW (ref 22–29)
CREAT SERPL-MCNC: 0.45 MG/DL — LOW (ref 0.5–1.3)
CULTURE RESULTS: NO GROWTH — SIGNIFICANT CHANGE UP
EOSINOPHIL # BLD AUTO: 0.1 K/UL — SIGNIFICANT CHANGE UP (ref 0–0.5)
EOSINOPHIL NFR BLD AUTO: 0.3 % — SIGNIFICANT CHANGE UP (ref 0–6)
GLUCOSE SERPL-MCNC: 144 MG/DL — HIGH (ref 70–115)
HCT VFR BLD CALC: 33.5 % — LOW (ref 37–47)
HGB BLD-MCNC: 11 G/DL — LOW (ref 12–16)
LYMPHOCYTES # BLD AUTO: 1.8 K/UL — SIGNIFICANT CHANGE UP (ref 1–4.8)
LYMPHOCYTES # BLD AUTO: 8.7 % — LOW (ref 20–55)
MAGNESIUM SERPL-MCNC: 2.1 MG/DL — SIGNIFICANT CHANGE UP (ref 1.6–2.6)
MCHC RBC-ENTMCNC: 29.4 PG — SIGNIFICANT CHANGE UP (ref 27–31)
MCHC RBC-ENTMCNC: 32.8 G/DL — SIGNIFICANT CHANGE UP (ref 32–36)
MCV RBC AUTO: 89.6 FL — SIGNIFICANT CHANGE UP (ref 81–99)
MONOCYTES # BLD AUTO: 1 K/UL — HIGH (ref 0–0.8)
MONOCYTES NFR BLD AUTO: 4.9 % — SIGNIFICANT CHANGE UP (ref 3–10)
NEUTROPHILS # BLD AUTO: 17.2 K/UL — HIGH (ref 1.8–8)
NEUTROPHILS NFR BLD AUTO: 85.4 % — HIGH (ref 37–73)
PHOSPHATE SERPL-MCNC: 2 MG/DL — LOW (ref 2.4–4.7)
PLATELET # BLD AUTO: 173 K/UL — SIGNIFICANT CHANGE UP (ref 150–400)
POTASSIUM SERPL-MCNC: 4.5 MMOL/L — SIGNIFICANT CHANGE UP (ref 3.5–5.3)
POTASSIUM SERPL-SCNC: 4.5 MMOL/L — SIGNIFICANT CHANGE UP (ref 3.5–5.3)
PROT SERPL-MCNC: 5.9 G/DL — LOW (ref 6.6–8.7)
RBC # BLD: 3.74 M/UL — LOW (ref 4.4–5.2)
RBC # FLD: 14.3 % — SIGNIFICANT CHANGE UP (ref 11–15.6)
SODIUM SERPL-SCNC: 139 MMOL/L — SIGNIFICANT CHANGE UP (ref 135–145)
SPECIMEN SOURCE: SIGNIFICANT CHANGE UP
WBC # BLD: 20.1 K/UL — HIGH (ref 4.8–10.8)
WBC # FLD AUTO: 20.1 K/UL — HIGH (ref 4.8–10.8)

## 2018-06-16 PROCEDURE — 99233 SBSQ HOSP IP/OBS HIGH 50: CPT | Mod: GC

## 2018-06-16 RX ORDER — DIPHENHYDRAMINE HCL 50 MG
25 CAPSULE ORAL ONCE
Qty: 0 | Refills: 0 | Status: DISCONTINUED | OUTPATIENT
Start: 2018-06-16 | End: 2018-06-16

## 2018-06-16 RX ORDER — CIPROFLOXACIN LACTATE 400MG/40ML
VIAL (ML) INTRAVENOUS
Qty: 0 | Refills: 0 | Status: DISCONTINUED | OUTPATIENT
Start: 2018-06-16 | End: 2018-06-16

## 2018-06-16 RX ORDER — CIPROFLOXACIN LACTATE 400MG/40ML
400 VIAL (ML) INTRAVENOUS ONCE
Qty: 0 | Refills: 0 | Status: COMPLETED | OUTPATIENT
Start: 2018-06-16 | End: 2018-06-16

## 2018-06-16 RX ORDER — DIPHENHYDRAMINE HCL 50 MG
25 CAPSULE ORAL ONCE
Qty: 0 | Refills: 0 | Status: COMPLETED | OUTPATIENT
Start: 2018-06-16 | End: 2018-06-16

## 2018-06-16 RX ORDER — DIPHENHYDRAMINE HCL 50 MG
50 CAPSULE ORAL ONCE
Qty: 0 | Refills: 0 | Status: COMPLETED | OUTPATIENT
Start: 2018-06-16 | End: 2018-06-16

## 2018-06-16 RX ORDER — HYDROCORTISONE 1 %
1 OINTMENT (GRAM) TOPICAL
Qty: 0 | Refills: 0 | Status: DISCONTINUED | OUTPATIENT
Start: 2018-06-16 | End: 2018-06-17

## 2018-06-16 RX ORDER — CIPROFLOXACIN LACTATE 400MG/40ML
400 VIAL (ML) INTRAVENOUS EVERY 12 HOURS
Qty: 0 | Refills: 0 | Status: DISCONTINUED | OUTPATIENT
Start: 2018-06-16 | End: 2018-06-16

## 2018-06-16 RX ADMIN — Medication 1 TABLET(S): at 05:11

## 2018-06-16 RX ADMIN — ONDANSETRON 4 MILLIGRAM(S): 8 TABLET, FILM COATED ORAL at 11:44

## 2018-06-16 RX ADMIN — Medication 60 MILLIGRAM(S): at 23:07

## 2018-06-16 RX ADMIN — Medication 25 MILLIGRAM(S): at 18:22

## 2018-06-16 RX ADMIN — Medication 125 MILLIGRAM(S): at 10:27

## 2018-06-16 RX ADMIN — Medication 25 MILLIGRAM(S): at 00:16

## 2018-06-16 RX ADMIN — Medication 200 MILLIGRAM(S): at 08:43

## 2018-06-16 RX ADMIN — BENZOCAINE AND MENTHOL 1 LOZENGE: 5; 1 LIQUID ORAL at 18:21

## 2018-06-16 RX ADMIN — Medication 25 MILLIGRAM(S): at 10:02

## 2018-06-16 RX ADMIN — SODIUM CHLORIDE 100 MILLILITER(S): 9 INJECTION, SOLUTION INTRAVENOUS at 21:08

## 2018-06-16 RX ADMIN — BENZOCAINE AND MENTHOL 1 LOZENGE: 5; 1 LIQUID ORAL at 11:44

## 2018-06-16 RX ADMIN — Medication 1 TABLET(S): at 18:23

## 2018-06-16 RX ADMIN — BENZOCAINE AND MENTHOL 1 LOZENGE: 5; 1 LIQUID ORAL at 00:16

## 2018-06-16 RX ADMIN — BENZOCAINE AND MENTHOL 1 LOZENGE: 5; 1 LIQUID ORAL at 05:11

## 2018-06-16 RX ADMIN — Medication 1 APPLICATION(S): at 21:07

## 2018-06-16 RX ADMIN — MONTELUKAST 10 MILLIGRAM(S): 4 TABLET, CHEWABLE ORAL at 21:07

## 2018-06-16 RX ADMIN — ONDANSETRON 4 MILLIGRAM(S): 8 TABLET, FILM COATED ORAL at 11:46

## 2018-06-16 RX ADMIN — Medication 25 MILLIGRAM(S): at 05:11

## 2018-06-16 RX ADMIN — BENZOCAINE AND MENTHOL 1 LOZENGE: 5; 1 LIQUID ORAL at 23:07

## 2018-06-16 RX ADMIN — Medication 60 MILLIGRAM(S): at 18:20

## 2018-06-16 RX ADMIN — Medication 25 MILLIGRAM(S): at 23:08

## 2018-06-16 RX ADMIN — ONDANSETRON 4 MILLIGRAM(S): 8 TABLET, FILM COATED ORAL at 08:43

## 2018-06-16 RX ADMIN — Medication 50 MILLIGRAM(S): at 11:40

## 2018-06-16 NOTE — PROVIDER CONTACT NOTE (OTHER) - ASSESSMENT
Pt did c/o small itchy rash on chest before Cipro was given.  Benadryl was given at 6 am by previous shift
Pt lower and upper lips swollen, denies chest pain, difficulty breathing, no tongue swelling noted. Lung sounds clear to auscultation. Blue team in to assess patient.

## 2018-06-16 NOTE — PROGRESS NOTE ADULT - ASSESSMENT
This is a 39 yo F  with a PMHx of asthma ( mild intermittent) who presented with c/o chest pain, sob and back pain, a/w UTI and no e/o pyelo.  Improving overall, but with episode of angioedema on the night of 6/14- unclear causative agent but likely to be due to pork consumption as she has had similar episodes in the past with pork consumption. Today, she received ciprofloxacin to treat UTI and developed a diffuse allergic reaction with hives that were blanchable. Responded well to solumedrol and benadryl.     1. Allergic reaction:   -at this point, unclear what it is due to as she had a known allergic reaction to pork yesterday with a similar reaction in the past. This could be the biphasic peak of that allergic reaction which did appear to be close to an anaphylactoid reaction given the lip and eyelid swelling with the facial flushing. She had the rash on her chest prior to cipro and it worsened after cipro which is in a different class than the rocephin she has been receiving for the last few days. The reaction should not be this severe on the first administration. Unlikely allergic to rocephin/cipro and more likely to be due to the pork that she consumes   -solumedrol 125 x 1, benadryl 50mg ivp, standing benadryl and solumedrol order   -epipen at bedside   -if no improvement, Allergy eval     2. UTI-   with initial presentation meeting sepsis criteria. Leucocytosis noted, mild increase with no recurrent fevers   -no labs today   -not likely to have had pyelo on admission, no fevers, cultures are all negative   -received 2 doses of rocephin and one of cipro. no further abx noted   -urine cx negative   -elevated lfts noted, likely 2/2 infection, will f/up   3. Headache - new onset, tylenol given. On admission was present   -resolved     4. URI - resolved, supportive care    5. Hypophosphatemia & hypocalcemia - repleted, f/up in am  -Oral calcium given x 2 days, f/up labs in am     6. Asthma - stable, prn nebs     7. Normocytic anemia - stable, Menstruating female. Monitor. No e/o acute bleed     8. Depression - since post partum (child is in pre k) - chronic at this point   No suicidal or homicidal ideation   -rec OP psych for anti depressant therapy.     9. Prophylactic measure - scds for dvt ppx now       Plan d/w patient at bedside with Turkish speaking resident. She does not want us to call her , states she will speak to him herself.

## 2018-06-16 NOTE — PROGRESS NOTE ADULT - SUBJECTIVE AND OBJECTIVE BOX
Patient is a 38y old  Female who presents with a chief complaint of Chest pain, back pain & abdominal pain (14 Jun 2018 01:21)    BRIEF HOSPITAL COURSE: Patient seen and examined at bedside. During morning rounds, patient was noted to have diffuse urticaria, blanchable and c/o itching soon after the cipro dose. Initially, the patient had some redness to her chest but then towards the end of the abx infusion, started to have diffuse urticaria, facial flushing and c/o feeling hot. No diarrhea, abdominal pain.     ROS: No chest pain, palpitations, sob, difficulty breathing/cough, fevers/chills, abdominal pain, n/v, diarrhea/constipation, dysuria or increased urinary frequency. Rest of ROS negative     Vital Signs Last 24 Hrs  T(C): 36.8 (16 Jun 2018 11:42), Max: 36.9 (15 Gabino 2018 15:19)  T(F): 98.2 (16 Jun 2018 11:42), Max: 98.4 (15 Gabino 2018 15:19)  HR: 81 (16 Jun 2018 11:42) (74 - 81)  BP: 109/62 (16 Jun 2018 11:42) (104/70 - 118/66)  RR: 18 (16 Jun 2018 11:42) (18 - 18)  SpO2: 96% (16 Jun 2018 11:42) (94% - 98%)    Physical Examination:    General: mild distress from reaction noted  HEENT: Pupils equal, reactive to light.  Symmetric. b/l injected conjunctiva improved from yesterday. Lip and eyelid swelling improved. MMM. facial flushing  NECK: Supple, no lymphadenopathy, trachea midline. no pharyngitis noted, no uvula edema  RESP: Clear to auscultation bilaterally, no significant sputum production  CV: Regular rate and rhythm, no murmurs, rubs, or gallops  ABD: Soft, nondistended, nontender, normoactive bowel sounds  EXT: mild edema b/l non pitting   SKIN: Warm and well perfused. Diffuse blanchable urticaria     LABS: NO NEW LABS                         9.9    12.6  )-----------( 151      ( 15 Gabnio 2018 04:12 )             30.2     06-15    141  |  109<H>  |  4.0<L>  ----------------------------<  121<H>  3.8   |  21.0<L>  |  0.40<L>    Ca    7.5<L>      15 Gabino 2018 04:12  Phos  1.8     06-15  Mg     2.3     06-15    TPro  5.3<L>  /  Alb  2.8<L>  /  TBili  <0.2<L>  /  DBili  x   /  AST  33<H>  /  ALT  43<H>  /  AlkPhos  70  06-15    PT/INR - ( 13 Jun 2018 17:43 )   PT: 11.3 sec;   INR: 1.03 ratio         PTT - ( 13 Jun 2018 17:43 )  PTT:25.3 sec  CARDIAC MARKERS ( 14 Jun 2018 08:16 )  x     / <0.01 ng/mL / x     / x     / x      CARDIAC MARKERS ( 13 Jun 2018 17:43 )  x     / <0.01 ng/mL / x     / x     / x            Culture - Urine (collected 13 Jun 2018 19:16)  Source: .Urine Clean Catch (Midstream)  Final Report (14 Jun 2018 16:28):    Culture grew 3 or more types of organisms which indicate    collection contamination; consider recollection only if clinically    indicated.    Spoke with Hugh Senior  06/14/2018 16:28:09    Urine culture negative

## 2018-06-16 NOTE — PROVIDER CONTACT NOTE (OTHER) - ACTION/TREATMENT ORDERED:
After Cipro given as ordered, pt's rash spread and was very uncomfortable.  Benadryl given as ordered.  Will continue to monitor
Benadryl 25 mg IV push STAT.

## 2018-06-17 ENCOUNTER — TRANSCRIPTION ENCOUNTER (OUTPATIENT)
Age: 38
End: 2018-06-17

## 2018-06-17 LAB
ALBUMIN SERPL ELPH-MCNC: 3.4 G/DL — SIGNIFICANT CHANGE UP (ref 3.3–5.2)
ALP SERPL-CCNC: 81 U/L — SIGNIFICANT CHANGE UP (ref 40–120)
ALT FLD-CCNC: 53 U/L — HIGH
ANION GAP SERPL CALC-SCNC: 12 MMOL/L — SIGNIFICANT CHANGE UP (ref 5–17)
AST SERPL-CCNC: 19 U/L — SIGNIFICANT CHANGE UP
BASOPHILS # BLD AUTO: 0 K/UL — SIGNIFICANT CHANGE UP (ref 0–0.2)
BASOPHILS NFR BLD AUTO: 0.1 % — SIGNIFICANT CHANGE UP (ref 0–2)
BILIRUB SERPL-MCNC: <0.2 MG/DL — LOW (ref 0.4–2)
BUN SERPL-MCNC: 11 MG/DL — SIGNIFICANT CHANGE UP (ref 8–20)
CALCIUM SERPL-MCNC: 8.8 MG/DL — SIGNIFICANT CHANGE UP (ref 8.6–10.2)
CHLORIDE SERPL-SCNC: 102 MMOL/L — SIGNIFICANT CHANGE UP (ref 98–107)
CO2 SERPL-SCNC: 25 MMOL/L — SIGNIFICANT CHANGE UP (ref 22–29)
CREAT SERPL-MCNC: 0.49 MG/DL — LOW (ref 0.5–1.3)
EOSINOPHIL # BLD AUTO: 0 K/UL — SIGNIFICANT CHANGE UP (ref 0–0.5)
EOSINOPHIL NFR BLD AUTO: 0.1 % — SIGNIFICANT CHANGE UP (ref 0–6)
GLUCOSE SERPL-MCNC: 138 MG/DL — HIGH (ref 70–115)
HCT VFR BLD CALC: 36.2 % — LOW (ref 37–47)
HGB BLD-MCNC: 11.8 G/DL — LOW (ref 12–16)
LYMPHOCYTES # BLD AUTO: 16.4 % — LOW (ref 20–55)
LYMPHOCYTES # BLD AUTO: 2.8 K/UL — SIGNIFICANT CHANGE UP (ref 1–4.8)
MCHC RBC-ENTMCNC: 29.2 PG — SIGNIFICANT CHANGE UP (ref 27–31)
MCHC RBC-ENTMCNC: 32.6 G/DL — SIGNIFICANT CHANGE UP (ref 32–36)
MCV RBC AUTO: 89.6 FL — SIGNIFICANT CHANGE UP (ref 81–99)
MONOCYTES # BLD AUTO: 0.6 K/UL — SIGNIFICANT CHANGE UP (ref 0–0.8)
MONOCYTES NFR BLD AUTO: 3.7 % — SIGNIFICANT CHANGE UP (ref 3–10)
NEUTROPHILS # BLD AUTO: 13.3 K/UL — HIGH (ref 1.8–8)
NEUTROPHILS NFR BLD AUTO: 78.7 % — HIGH (ref 37–73)
PHOSPHATE SERPL-MCNC: 3.4 MG/DL — SIGNIFICANT CHANGE UP (ref 2.4–4.7)
PLAT MORPH BLD: NORMAL — SIGNIFICANT CHANGE UP
PLATELET # BLD AUTO: 216 K/UL — SIGNIFICANT CHANGE UP (ref 150–400)
POTASSIUM SERPL-MCNC: 3.8 MMOL/L — SIGNIFICANT CHANGE UP (ref 3.5–5.3)
POTASSIUM SERPL-SCNC: 3.8 MMOL/L — SIGNIFICANT CHANGE UP (ref 3.5–5.3)
PROT SERPL-MCNC: 6.2 G/DL — LOW (ref 6.6–8.7)
RBC # BLD: 4.04 M/UL — LOW (ref 4.4–5.2)
RBC # FLD: 14.2 % — SIGNIFICANT CHANGE UP (ref 11–15.6)
RBC BLD AUTO: NORMAL — SIGNIFICANT CHANGE UP
RHEUMATOID FACT SERPL-ACNC: 13 IU/ML — SIGNIFICANT CHANGE UP (ref 0–13)
SODIUM SERPL-SCNC: 139 MMOL/L — SIGNIFICANT CHANGE UP (ref 135–145)
WBC # BLD: 16.9 K/UL — HIGH (ref 4.8–10.8)
WBC # FLD AUTO: 16.9 K/UL — HIGH (ref 4.8–10.8)

## 2018-06-17 PROCEDURE — 99233 SBSQ HOSP IP/OBS HIGH 50: CPT | Mod: GC

## 2018-06-17 RX ORDER — LORATADINE 10 MG/1
10 TABLET ORAL DAILY
Qty: 0 | Refills: 0 | Status: DISCONTINUED | OUTPATIENT
Start: 2018-06-17 | End: 2018-06-17

## 2018-06-17 RX ORDER — MEN-PHOR .5; .5 G/G; G/G
1 LOTION TOPICAL
Qty: 0 | Refills: 0 | Status: DISCONTINUED | OUTPATIENT
Start: 2018-06-17 | End: 2018-06-17

## 2018-06-17 RX ORDER — DIPHENHYDRAMINE HCL 50 MG
50 CAPSULE ORAL EVERY 6 HOURS
Qty: 0 | Refills: 0 | Status: DISCONTINUED | OUTPATIENT
Start: 2018-06-17 | End: 2018-06-17

## 2018-06-17 RX ORDER — FAMOTIDINE 10 MG/ML
20 INJECTION INTRAVENOUS ONCE
Qty: 0 | Refills: 0 | Status: COMPLETED | OUTPATIENT
Start: 2018-06-17 | End: 2018-06-17

## 2018-06-17 RX ORDER — HYDROCORTISONE 1 %
1 OINTMENT (GRAM) TOPICAL
Qty: 0 | Refills: 0 | Status: DISCONTINUED | OUTPATIENT
Start: 2018-06-17 | End: 2018-06-18

## 2018-06-17 RX ORDER — DIPHENHYDRAMINE HCL 50 MG
50 CAPSULE ORAL EVERY 6 HOURS
Qty: 0 | Refills: 0 | Status: DISCONTINUED | OUTPATIENT
Start: 2018-06-17 | End: 2018-06-18

## 2018-06-17 RX ORDER — FAMOTIDINE 10 MG/ML
20 INJECTION INTRAVENOUS
Qty: 0 | Refills: 0 | Status: DISCONTINUED | OUTPATIENT
Start: 2018-06-17 | End: 2018-06-18

## 2018-06-17 RX ADMIN — MEN-PHOR 1 APPLICATION(S): .5; .5 LOTION TOPICAL at 17:01

## 2018-06-17 RX ADMIN — BENZOCAINE AND MENTHOL 1 LOZENGE: 5; 1 LIQUID ORAL at 05:56

## 2018-06-17 RX ADMIN — BENZOCAINE AND MENTHOL 1 LOZENGE: 5; 1 LIQUID ORAL at 12:57

## 2018-06-17 RX ADMIN — Medication 25 MILLIGRAM(S): at 17:01

## 2018-06-17 RX ADMIN — Medication 60 MILLIGRAM(S): at 05:56

## 2018-06-17 RX ADMIN — Medication 50 MILLIGRAM(S): at 23:47

## 2018-06-17 RX ADMIN — Medication 1 APPLICATION(S): at 05:57

## 2018-06-17 RX ADMIN — Medication 1 APPLICATION(S): at 17:01

## 2018-06-17 RX ADMIN — ONDANSETRON 4 MILLIGRAM(S): 8 TABLET, FILM COATED ORAL at 12:59

## 2018-06-17 RX ADMIN — Medication 60 MILLIGRAM(S): at 17:00

## 2018-06-17 RX ADMIN — ONDANSETRON 4 MILLIGRAM(S): 8 TABLET, FILM COATED ORAL at 16:59

## 2018-06-17 RX ADMIN — FAMOTIDINE 20 MILLIGRAM(S): 10 INJECTION INTRAVENOUS at 22:16

## 2018-06-17 RX ADMIN — Medication 60 MILLIGRAM(S): at 10:34

## 2018-06-17 RX ADMIN — LORATADINE 10 MILLIGRAM(S): 10 TABLET ORAL at 12:57

## 2018-06-17 RX ADMIN — Medication 60 MILLIGRAM(S): at 23:47

## 2018-06-17 RX ADMIN — Medication 25 MILLIGRAM(S): at 10:34

## 2018-06-17 RX ADMIN — ONDANSETRON 4 MILLIGRAM(S): 8 TABLET, FILM COATED ORAL at 08:34

## 2018-06-17 RX ADMIN — Medication 650 MILLIGRAM(S): at 14:37

## 2018-06-17 RX ADMIN — SODIUM CHLORIDE 100 MILLILITER(S): 9 INJECTION, SOLUTION INTRAVENOUS at 05:57

## 2018-06-17 RX ADMIN — Medication 1 TABLET(S): at 05:56

## 2018-06-17 RX ADMIN — Medication 25 MILLIGRAM(S): at 05:56

## 2018-06-17 RX ADMIN — BENZOCAINE AND MENTHOL 1 LOZENGE: 5; 1 LIQUID ORAL at 17:00

## 2018-06-17 NOTE — DISCHARGE NOTE ADULT - PROVIDER TOKENS
FREE:[LAST:[[facility]],PHONE:[phone]],FAX:[phone]   [phone]    -],ADDRESS:[[facility] [place] [address]]] FREE:[LAST:[[facility]],PHONE:[phone]],FAX:[(   )    -],ADDRESS:[[address] [place] [address]]],TOKEN:'[id number]'

## 2018-06-17 NOTE — DISCHARGE NOTE ADULT - SECONDARY DIAGNOSIS.
Angioedema Viral upper respiratory tract infection Hypophosphatemia Hypocalcemia Asthma, unspecified asthma severity, unspecified whether complicated, unspecified whether persistent Depression, unspecified depression type

## 2018-06-17 NOTE — DISCHARGE NOTE ADULT - HOSPITAL COURSE
This is a 37 yo F  with a PMHx of asthma (mild intermittent) who presented with c/o SOB, back pain and abdominal pain admitted for suspected clinical pyelonephritis meeting sepsis criteria. Pt was tachycardic at 132 bpm and afebrile with a 101.2F temperature. In addition, pt found to have bilateral CVA tenderness with positive UA. Pt was given a dose of Ceftriaxone and Zithromax with florastor and received 2.5 L NS in the ED. Pt condition significant improved as she became afebrile with reduced pain. Hospital course was complicated by an episode of angioedema likely due to pork consumption which pt is allergic to.      Improving overall, but with episode of angioedema on the night of 6/14- unclear causative agent but likely to be due to pork consumption as she has had similar episodes in the past with pork consumption. On 6/16,she received ciprofloxacin to treat UTI and developed a diffuse allergic reaction with hives that were blanchable. Responded well to solumedrol and benadryl. On 6/17, patient developed diffuse hives with itching and no new agents given to her.   2. UTI-   with initial presentation meeting sepsis criteria. Leucocytosis noted, mild increase with no recurrent fevers   -not likely to have had pyelo on admission, no fevers, cultures are all negative   -received 2 doses of rocephin and one of cipro. no further abx noted   -urine cx negative   -elevated lfts noted, likely 2/2 infection, will f/up   -at this point, unclear what it is due to as she had a known allergic reaction to pork yesterday with a similar reaction in the past. This could be the biphasic peak of that allergic reaction which did appear to be close to an anaphylactoid reaction given the lip and eyelid swelling with the facial flushing. She had the rash on her chest prior to cipro and it worsened after cipro which is in a different class than the rocephin she has been receiving for the last few days. The reaction should not be this severe on the first administration. Unlikely allergic to rocephin/cipro and more likely to be due to the pork that she consumes   -c/w solumedrol at current dose  -benadryl standing and loratidine added   -Dr Faria from Allergy to be called  -given severity of reaction and this being the 4 time and most severe after pork consumption, would order Rheumatological w/up panel also This is a 37 yo F  with a PMHx of asthma (mild intermittent) who presented with c/o SOB, back pain and abdominal pain admitted for suspected clinical pyelonephritis meeting sepsis criteria. Pt was tachycardic at 132 bpm and afebrile with a 101.2F temperature. In addition, pt found to have bilateral CVA tenderness with positive UA. Pt was given a dose of Ceftriaxone and Zithromax with florastor and received 2.5 L NS in the ED. Pt condition significant improved as she became afebrile with reduced pain. Hospital course was complicated by an episode of angioedema likely due to pork consumption which pt is allergic to. Pt responded well to solumedrol and benadryl with significant reduction in symptoms. An allergist was consulted. This is a 37 yo F  with a PMHx of asthma (mild intermittent) who presented with c/o SOB, back pain and abdominal pain admitted for suspected clinical pyelonephritis meeting sepsis criteria. Pt was tachycardic at 132 bpm and afebrile with a 101.2F temperature. In addition, pt found to have bilateral CVA tenderness with positive UA. Pt was given a dose of Ceftriaxone and Zithromax with florastor and received 2.5 L NS in the ED. Later she was antibiotics were de-escalated to Ciprofloxacin .Pt condition significant improved as she became afebrile with reduced pain. Hospital course was complicated by an episode of angioedema with urticaria and severe pruritis  likely due to accidental pork consumption (pt allergy) or possibly Ceftriaxone/Cipro. The treatment included Benadryl, Solu-Medrol and topical Hydrocortisone resulting in resolution of angioedema and improvement of the rash and pruritis. Pt will follow up with allergist as outpatient. Patient was medically optimized for discharge and advised to return should any concerns arise. This is a 39 yo F  with a PMHx of asthma (mild intermittent) who presented with c/o SOB, back pain and abdominal pain admitted for suspected clinical pyelonephritis meeting sepsis criteria which has since.  Pt was tachycardic at 132 bpm and afebrile with a 101.2F temperature. In addition, pt found to have bilateral CVA tenderness with positive UA. Pt was given a dose of Ceftriaxone and Zithromax with florastor and received 2.5 L NS in the ED. Later she was antibiotics were de-escalated to Ciprofloxacin . She was given a 3 day regimen of antibiotics. Pt condition significant improved as she became afebrile with reduced pain. Hospital course was complicated by an episode of angioedema with urticaria and severe pruritis  likely due to accidental pork consumption (pt had a similar reaction 3 times previously after consuming pork - however, it was not this severe) or possibly Ceftriaxone/Cipro (She has a documented allergy to amoxicillin which she had not informed us of from the clinic records in the past. The treatment included Benadryl, Solu-Medrol and topical Hydrocortisone resulting in resolution of angioedema and improvement of the rash and pruritis. Pt will follow up with allergist as outpatient. Patient was medically optimized for discharge and advised to return should any concerns arise.   Her regimen was created to give her a non sedating regimen.   Patient planned for discharge to home today.     Total time coordinating this discharge was 40 minutes.

## 2018-06-17 NOTE — DISCHARGE NOTE ADULT - PATIENT PORTAL LINK FT
You can access the [id number] Patient Portal, offered by [facility], by registering with the following website: [email or web address]

## 2018-06-17 NOTE — DISCHARGE NOTE ADULT - PLAN OF CARE
Resolving During your hospital stay, you were diagnosed with a urinary tract infection. Please continue taking your medications as prescribed. Please follow up with your primary doctor within 1 week of discharge. During your hospital stay, you were diagnosed with a viral upper respiratory infection. Please continue taking your medications as prescribed. Please follow up with your primary doctor within 1 week of discharge. You were found to have a low phosphate level for which you were given phosphate supplement. Please be sure to follow up with your primary doctor to ensure resolution. You were found to have a low calcium level for which you were given calcium supplement. Please be sure to follow up with your primary doctor to ensure resolution. You have a history of asthma. Please be sure to follow up with your primary doctor as needed for this condition. During your hospital stay, you were found to have depression. Please follow up with a psychiatrist within 1 week of discharge for further evaluation. During your hospital stay, you had an allergic reaction to possibly pork or a medication. Please follow up with an allergist within 1 week of discharge. You may contact the allergist listed below, Dr. Akash Faria. During your hospital stay, you were diagnosed with a urinary tract infection. You received 3 total days of antibiotics (2 days of rocephin and one day of ciprofloxacin) with negative culture and therefore need no further antibiotics   Please drink plenty of water   Make sure that you urinate after intercourse During your hospital stay, you had an allergic reaction to possibly pork or a medication. Please follow up with an allergist within 1 week of discharge. You may contact the allergist listed below, Dr. Akash Faria.  We are not sure what you are allergic to, however, do not take any of the following antibiotics: amoxicillin (penicillins), rocephin or cipro. We have sent allergy testing for you and recommend that you follow up with the allergist (Dr Faria) to go over the rest of your allergy panel   -In the meantime, if you get swelling in your lips, or face, please come back to the ER During your hospital stay, you were diagnosed with a viral upper respiratory infection.  Symptoms have since then resolved this level was repleted   we recommend that you get blood work done in a week of discharge You were found to have a low calcium level for which you were given calcium supplement.   Follow up repeat blood work in a week after discharge You have a history of asthma.   Continue taking home medications and follow up with Dr Faria who is an allergist who works with asthmatic patients closely

## 2018-06-17 NOTE — DISCHARGE NOTE ADULT - CARE PROVIDER_API CALL
[facility],   [address] [place] [address]  Phone: [phone]  Fax: (       - [facility],   [address] [place] [address]  Phone: [phone]  Fax: (   )    -    Akash Faria), [facility]  [address] [place] [address]  Phone: [phone]  Fax: [phone]

## 2018-06-17 NOTE — DISCHARGE NOTE ADULT - OTHER SIGNIFICANT FINDINGS
CBC Full  -  ( 18 Jun 2018 04:17 )  WBC Count : 14.6 K/uL  Hemoglobin : 11.0 g/dL  Hematocrit : 33.7 %  Platelet Count - Automated : 221 K/uL  Mean Cell Volume : 89.9 fl  Mean Cell Hemoglobin : 29.3 pg  Mean Cell Hemoglobin Concentration : 32.6 g/dL    06-18    139  |  103  |  12.0  ----------------------------<  181<H>  3.9   |  25.0  |  0.49<L>    Ca    8.4<L>      18 Jun 2018 04:17  Phos  3.4     06-17    TPro  6.0<L>  /  Alb  3.4  /  TBili  <0.2<L>  /  DBili  x   /  AST  14  /  ALT  46<H>  /  AlkPhos  72  06-18 CBC Full  -  ( 18 Jun 2018 04:17 )  WBC Count : 14.6 K/uL  Hemoglobin : 11.0 g/dL  Hematocrit : 33.7 %  Platelet Count - Automated : 221 K/uL  Mean Cell Volume : 89.9 fl  Mean Cell Hemoglobin : 29.3 pg  Mean Cell Hemoglobin Concentration : 32.6 g/dL    06-18    139  |  103  |  12.0  ----------------------------<  181<H>  3.9   |  25.0  |  0.49<L>    Ca    8.4<L>      18 Jun 2018 04:17  Phos  3.4     06-17    TPro  6.0<L>  /  Alb  3.4  /  TBili  <0.2<L>  /  DBili  x   /  AST  14  /  ALT  46<H>  /  AlkPhos  72  06-18    < from: CT Angio Chest w/ IV Cont (06.13.18 @ 20:59) >  IMPRESSION:    No evidence of pulmonary embolism.  Clear lungs.  Nonspecific bilateral axillary lymph nodes.    < from: CT Renal Stone [place] (06.14.18 @ 00:21) >  IMPRESSION:         Images are somewhat degraded by artifact from patient motion.      Cannot exclude renal collecting system stones as the exam was performed   after   IV contrast administration and there is dense contrast in the urinary   tract   collecting system. No hydronephrosis or hydroureter.      Nonspecific small groin andexternal iliac chain lymph nodes.      Colonic diverticulosis. 11.0   14.6  )-----------( 221      ( 18 Jun 2018 04:17 )             33.7       06-18    139  |  103  |  12.0  ----------------------------<  181<H>  3.9   |  25.0  |  0.49<L>    Ca    8.4<L>      18 Jun 2018 04:17  Phos  3.4     06-17    TPro  6.0<L>  /  Alb  3.4  /  TBili  <0.2<L>  /  DBili  x   /  AST  14  /  ALT  46<H>  /  AlkPhos  72  06-18    < from: CT Angio Chest w/ IV Cont (06.13.18 @ 20:59) >  IMPRESSION:    No evidence of pulmonary embolism.  Clear lungs.  Nonspecific bilateral axillary lymph nodes.    < from: CT Renal Stone Hunt (06.14.18 @ 00:21) >  IMPRESSION:         Images are somewhat degraded by artifact from patient motion.      Cannot exclude renal collecting system stones as the exam was performed   after   IV contrast administration and there is dense contrast in the urinary   tract   collecting system. No hydronephrosis or hydroureter.      Nonspecific small groin andexternal iliac chain lymph nodes.      Colonic diverticulosis.

## 2018-06-17 NOTE — DISCHARGE NOTE ADULT - ADDITIONAL INSTRUCTIONS
Please follow up with your primary doctor within 1 week of discharge. Please follow up with your Primary care doctor in 2-3 days after discharge  Telephone encounter for clinic appointment sent in

## 2018-06-17 NOTE — PROGRESS NOTE ADULT - ASSESSMENT
This is a 37 yo F  with a PMHx of asthma ( mild intermittent) who presented with c/o chest pain, sob and back pain, a/w UTI and no e/o pyelo.   Episode of Angioedema on 6/14/18 overnight, unclear if 2/2 pork;    6/16/18 in am patient recieved ciprofloxacin and subsequently developed diffuse allergic rxn with hives (blanchable).  Significantly reduced with solumedrol and benadryl regimen.         1. Allergic reaction:   -, hemodynamically stable;  no respiratory difficulties this am;    -  Unclear whether if secodnary to pork exposure (similar to previous episodes)  -Concern for biphasic peak of allergic reaction;  Lip and eyelid swelling, facial flushing, consstent with possible anaphylactoid reaction.    -Previously was on rocephin for 3 days;  rash worsened with exposure to ciprofloxacin.  More likely the pork exposure is the cause of patients rash.    -Claritin po added on board this am;  continue   -Pending allergy eval by   -solumedrol 125 x 1, benadryl 50mg ivp, standing benadryl and solumedrol order   -epipen at bedside  -diphenhydrAMINE   Capsule 25 milliGRAM(s) Oral every 6 hours  hydrocortisone 0.5% Cream 1 Application(s) Topical two times a day  loratadine 10 milliGRAM(s) Oral daily  methylPREDNISolone sodium succinate Injectable 60 milliGRAM(s) IV Push every 6 hours    2. UTI-     -Afebrile, hemodynamically stable;  no suprapubic discomfort or flank ttp;    -Leukocytosis fluctuating in the setting of the steroid use;     -Patient intially met sepsis criteria;  not likely pyelo in the setting of patient being afebrile, negative cultures;    -received 2 doses of rocephin and one of cipro. no further abx noted   -urine cx negative   -elevated lfts noted, likely 2/2 infection;  ast trending down, alt fluctuating;       3. Headache - new onset, tylenol given. On admission was present   -resolved     4. URI - resolved, supportive care    5. Hypophosphatemia & hypocalcemia - repleted, f/up in am  -Oral calcium given x 2 days, f/up labs in am     6. Asthma - stable, prn nebs     7. Normocytic anemia - stable, Menstruating female. Monitor. No e/o acute bleed     8. Depression - since post partum (child is in pre k) - chronic at this point   No suicidal or homicidal ideation   -rec OP psych for anti depressant therapy.     9. Prophylactic measure - scds for dvt ppx now       Plan d/w patient at bedside with Vietnamese speaking resident. She does not want us to call her , states she will speak to him herself. 37 yo Female  with PMH of Asthma ( mild intermittent) who presented with cc chest pain, sob and back pain, a/w UTI and no e/o pyelo.   Episode of Angioedema on 6/14/18 overnight, unclear if 2/2 pork; patient treated with benadryl 25mg iv once;  then given solumedrol 125mg iv once;  benadryl 25po q 6hours from noon 6/15/18;     6/16/18 in am patient recieved ciprofloxacin and subsequently developed diffuse allergic rxn with hives (blanchable).  Significantly reduced with solumedrol and benadryl regimen.         1. Allergic reaction:   -, hemodynamically stable;  no respiratory difficulties this am;    -  Unclear whether if secodnary to pork exposure (similar to previous episodes)  -Concern for biphasic peak of allergic reaction;  Lip and eyelid swelling, facial flushing, consstent with possible anaphylactoid reaction.    -Previously was on rocephin for 3 days;  rash worsened with exposure to ciprofloxacin.  More likely the pork exposure is the cause of patients rash.    -Claritin po added on board this am;  continue   -Pending allergy eval by   -solumedrol 125 x 1, benadryl 50mg ivp, standing benadryl and solumedrol order   -epipen at bedside  -diphenhydrAMINE   Capsule 25 milliGRAM(s) Oral every 6 hours  hydrocortisone 0.5% Cream 1 Application(s) Topical two times a day  loratadine 10 milliGRAM(s) Oral daily  methylPREDNISolone sodium succinate Injectable 60 milliGRAM(s) IV Push every 6 hours    2. UTI-     -Afebrile, hemodynamically stable;  no suprapubic discomfort or flank ttp;    -Leukocytosis fluctuating in the setting of the steroid use;     -Patient intially met sepsis criteria;  not likely pyelo in the setting of patient being afebrile, negative cultures;    -received 2 doses of rocephin and one of cipro. no further abx noted   -urine cx negative   -elevated lfts noted, likely 2/2 infection;  ast trending down, alt fluctuating;       3. Headache - new onset, tylenol given. On admission was present   -resolved     4. URI - resolved, supportive care    5. Hypophosphatemia & hypocalcemia - repleted, f/up in am  -Oral calcium given x 2 days, f/up labs in am     6. Asthma - stable, prn nebs     7. Normocytic anemia - stable, Menstruating female. Monitor. No e/o acute bleed     8. Depression - since post partum (child is in pre k) - chronic at this point   No suicidal or homicidal ideation   -rec OP psych for anti depressant therapy.     9. Prophylactic measure - scds for dvt ppx now       Plan d/w patient at bedside with Maltese speaking resident. She does not want us to call her , states she will speak to him herself. This is a 37 yo F  with a PMHx of asthma ( mild intermittent) who presented with c/o chest pain, sob and back pain, a/w UTI and no e/o pyelo.  Improving overall, but with episode of angioedema on the night of 6/14- unclear causative agent but likely to be due to pork consumption as she has had similar episodes in the past with pork consumption. On 6/16,she received ciprofloxacin to treat UTI and developed a diffuse allergic reaction with hives that were blanchable. Responded well to solumedrol and benadryl. On 6/17, patient developed diffuse hives with itching and no new agents given to her.     1. Allergic reaction:   -at this point, unclear what it is due to as she had a known allergic reaction to pork yesterday with a similar reaction in the past. This could be the biphasic peak of that allergic reaction which did appear to be close to an anaphylactoid reaction given the lip and eyelid swelling with the facial flushing. She had the rash on her chest prior to cipro and it worsened after cipro which is in a different class than the rocephin she has been receiving for the last few days. The reaction should not be this severe on the first administration. Unlikely allergic to rocephin/cipro and more likely to be due to the pork that she consumes   -c/w solumedrol at current dose  -benadryl standing and loratidine added   -Dr Faria from [facility] to be called  -given severity of reaction and this being the 4 time and most severe after pork consumption, would order Rheumatological w/up panel also     2. UTI-   with initial presentation meeting sepsis criteria. Leucocytosis noted, mild increase with no recurrent fevers   -not likely to have had pyelo on admission, no fevers, cultures are all negative   -received 2 doses of rocephin and one of cipro. no further abx noted   -urine cx negative   -elevated lfts noted, likely 2/2 infection, will f/up     3. Headache - new onset, tylenol given. On admission was present   -resolved     4. URI - resolved, supportive care    5. Hypophosphatemia & hypocalcemia - repleted, f/up in am  -Oral calcium given x 2 days, f/up labs in am     6. Asthma - stable, prn nebs     7. Normocytic anemia - stable, Menstruating female. Monitor. No e/o acute bleed     8. Depression - since post partum (child is in pre k) - chronic at this point   No suicidal or homicidal ideation   -rec OP psych for anti depressant therapy.     9. Prophylactic measure - scds for dvt ppx now       Plan d/w patient at bedside with . She does not want us to call her , states she will speak to him herself.

## 2018-06-17 NOTE — PROGRESS NOTE ADULT - SUBJECTIVE AND OBJECTIVE BOX
Daniel     Patient is a 38y old  Female who presents with a chief complaint of Chest pain, back pain & abdominal pain (14 Jun 2018 01:21)  Patient developed diffuse urticaria, blanchable and c/o itching soon after the cipro dose. Initially, the patient had some redness to her chest but then towards the end of the abx infusion, started to have diffuse urticaria, facial flushing and c/o feeling hot. She also consumed pork during this time, which has caused previous reactions in the past.        This am;      Patient seen and examined at bedside. She reports reduced redness, and no sob/wheezing.   She denies any fever, dysuria, hematuria.      ROS: No chest pain, palpitations, sob, difficulty breathing/cough, fevers/chills, abdominal pain, n/v, diarrhea/constipation, dysuria or increased urinary frequency.     Vital Signs Last 24 Hrs  T(C): 37 (17 Jun 2018 07:36), Max: 37 (17 Jun 2018 07:36)  T(F): 98.6 (17 Jun 2018 07:36), Max: 98.6 (17 Jun 2018 07:36)  HR: 74 (17 Jun 2018 07:36) (70 - 88)  BP: 119/72 (17 Jun 2018 07:36) (109/62 - 123/75)  RR: 18 (17 Jun 2018 07:36) (18 - 18)  SpO2: 96% (17 Jun 2018 07:36) (96% - 98%)    Physical Examination:  General:  NAD at bedside;  relaxeed;    HEENT: PERRLA, clear conjunctiva;  Reduced lip and eyelid swelling. no facial flushing;  no swelling of the post pharynx;   NECK: Supple, no lymphadenopathy, trachea midline. throat edema/swelling;   RESP: CTAB, no wheezing/rales;   CV:S1, S2, RRR, no murmurs, rubs, or gallops  ABD: Soft, nondistended, nontender, normoactive bowel sounds; no suprapubic ttp;    EXT: no edema appreciated;   SKIN: Warm skin. Diffuse blanchable urticaria, reduced, present on arms, chest, legs;     LABS:   CBC Full  -  ( 17 Jun 2018 08:48 )  WBC Count : 16.9 K/uL   Leukocytosis;   Hemoglobin : 11.8 g/dL     NORMOCYTIC ANEMIA;   Hematocrit : 36.2 %  Platelet Count - Automated : 216 K/uL  Mean Cell Volume : 89.6 fl  Mean Cell Hemoglobin : 29.2 pg  Mean Cell Hemoglobin Concentration : 32.6 g/dL  Auto Neutrophil # : 13.3 K/uL  Auto Lymphocyte # : 2.8 K/uL  Auto Monocyte # : 0.6 K/uL  Auto Eosinophil # : 0.0 K/uL  Auto Basophil # : 0.0 K/uL  Auto Neutrophil % : 78.7 %  Auto Lymphocyte % : 16.4 %  Auto Monocyte % : 3.7 %  Auto Eosinophil % : 0.1 %  Auto Basophil % : 0.1 %    Chemistry                        11.8   16.9  )-----------( 216      ( 17 Jun 2018 08:48 )             36.2     06-17    139  |  102  |  11.0  ----------------------------<  138<H>  3.8   |  25.0  |  0.49<L>    Ca    8.8      17 Jun 2018 08:48  Phos  3.4     06-17  Mg     2.1     06-16    TPro  6.2<L>  /  Alb  3.4  /  TBili  <0.2<L>  /  DBili  x   /  AST  19  /  ALT  53<H>  /  AlkPhos  81  06-17    LIVER FUNCTIONS - ( 17 Jun 2018 08:48 )  Alb: 3.4 g/dL / Pro: 6.2 g/dL / ALK PHOS: 81 U/L / ALT: 53 U/L / AST: 19 U/L / GGT: x                     MICROBIOLOGY/CULTURES:  Culture Results:   No growth (06-14 @ 22:10)  Culture Results:   Culture grew 3 or more types of organisms which indicate  collection contamination; consider recollection only if clinically  indicated.  Spoke with Hugh Senior  06/14/2018 16:28:09 (06-13 @ 19:16)  Culture Results:   No growth at 48 hours (06-13 @ 17:46)  Culture Results:   No growth at 48 hours (06-13 @ 17:46)      RADIOLOGY RESULTS:      Final Report (14 Jun 2018 16:28):    Culture grew 3 or more types of organisms which indicate    collection contamination; consider recollection only if clinically    indicated.    Spoke with Hugh Senior  06/14/2018 16:28:09    Urine culture negative           MEDICATIONS  (STANDING):  benzocaine 15 mG/menthol 3.6 mG Lozenge 1 Lozenge Oral every 6 hours  diphenhydrAMINE   Capsule 25 milliGRAM(s) Oral every 6 hours  hydrocortisone 0.5% Cream 1 Application(s) Topical two times a day  loratadine 10 milliGRAM(s) Oral daily  methylPREDNISolone sodium succinate Injectable 60 milliGRAM(s) IV Push every 6 hours  montelukast 10 milliGRAM(s) Oral at bedtime  multiple electrolytes Injection Type 1 1000 milliLiter(s) (100 mL/Hr) IV Continuous <Continuous>  ondansetron    Tablet 4 milliGRAM(s) Oral <User Schedule>    MEDICATIONS  (PRN):  acetaminophen   Tablet 650 milliGRAM(s) Oral every 6 hours PRN For Temp greater than 38 C (100.4 F)  acetaminophen   Tablet. 650 milliGRAM(s) Oral every 6 hours PRN Moderate Pain (4 - 6)  ALBUTerol    0.083% 2.5 milliGRAM(s) Nebulizer every 6 hours PRN Shortness of Breath and/or Wheezing Daniel     Patient is a 38y old  Female who presented with a chief complaint of Chest pain, back pain & abdominal pain (14 Jun 2018 01:21).  Patient admitted for initial diagnosis of pyelonephritis, and treated initially with ceftriaxone from 6/13-6/14.     6/13/18  Rocephin + Azith  Episode of Angioedema on 6/14/18 overnight, unclear if 2/2 pork;   6/13- 6/14  Rocephin  6/16  Ciprofloxacin 1 dose in am;  subsequent urticaria/hives;     Patient developed diffuse urticaria, blanchable and c/o itching soon after the cipro dose on 6/16/2018. Initially, the patient had some redness to her chest but then towards the end of the abx infusion, started to have diffuse urticaria, facial flushing and c/o feeling hot. She also consumed pork during this time, which has caused previous reactions in the past.      This am;      Patient seen and examined at bedside. She reports reduced redness, and no sob/wheezing.   She denies any fever, dysuria, hematuria.      ROS: No chest pain, palpitations, sob, difficulty breathing/cough, fevers/chills, abdominal pain, n/v, diarrhea/constipation, dysuria or increased urinary frequency.     Vital Signs Last 24 Hrs  T(C): 37 (17 Jun 2018 07:36), Max: 37 (17 Jun 2018 07:36)  T(F): 98.6 (17 Jun 2018 07:36), Max: 98.6 (17 Jun 2018 07:36)  HR: 74 (17 Jun 2018 07:36) (70 - 88)  BP: 119/72 (17 Jun 2018 07:36) (109/62 - 123/75)  RR: 18 (17 Jun 2018 07:36) (18 - 18)  SpO2: 96% (17 Jun 2018 07:36) (96% - 98%)    Physical Examination:  General:  NAD at bedside;  relaxeed;    HEENT: PERRLA, clear conjunctiva;  Reduced lip and eyelid swelling. no facial flushing;  no swelling of the post pharynx;   NECK: Supple, no lymphadenopathy, trachea midline. throat edema/swelling;   RESP: CTAB, no wheezing/rales;   CV:S1, S2, RRR, no murmurs, rubs, or gallops  ABD: Soft, nondistended, nontender, normoactive bowel sounds; no suprapubic ttp;    EXT: no edema appreciated;   SKIN: Warm skin. Diffuse blanchable urticaria, reduced, present on arms, chest, legs;     LABS:   CBC Full  -  ( 17 Jun 2018 08:48 )  WBC Count : 16.9 K/uL   Leukocytosis;   Hemoglobin : 11.8 g/dL     NORMOCYTIC ANEMIA;   Hematocrit : 36.2 %  Platelet Count - Automated : 216 K/uL  Mean Cell Volume : 89.6 fl  Mean Cell Hemoglobin : 29.2 pg  Mean Cell Hemoglobin Concentration : 32.6 g/dL  Auto Neutrophil # : 13.3 K/uL  Auto Lymphocyte # : 2.8 K/uL  Auto Monocyte # : 0.6 K/uL  Auto Eosinophil # : 0.0 K/uL  Auto Basophil # : 0.0 K/uL  Auto Neutrophil % : 78.7 %  Auto Lymphocyte % : 16.4 %  Auto Monocyte % : 3.7 %  Auto Eosinophil % : 0.1 %  Auto Basophil % : 0.1 %    Chemistry                        11.8   16.9  )-----------( 216      ( 17 Jun 2018 08:48 )             36.2     06-17    139  |  102  |  11.0  ----------------------------<  138<H>  3.8   |  25.0  |  0.49<L>    Ca    8.8      17 Jun 2018 08:48  Phos  3.4     06-17  Mg     2.1     06-16    TPro  6.2<L>  /  Alb  3.4  /  TBili  <0.2<L>  /  DBili  x   /  AST  19  /  ALT  53<H>  /  AlkPhos  81  06-17    LIVER FUNCTIONS - ( 17 Jun 2018 08:48 )  Alb: 3.4 g/dL / Pro: 6.2 g/dL / ALK PHOS: 81 U/L / ALT: 53 U/L / AST: 19 U/L / GGT: x                     MICROBIOLOGY/CULTURES:  Culture Results:   No growth (06-14 @ 22:10)  Culture Results:   Culture grew 3 or more types of organisms which indicate  collection contamination; consider recollection only if clinically  indicated.  Spoke with Hugh Senior  06/14/2018 16:28:09 (06-13 @ 19:16)  Culture Results:   No growth at 48 hours (06-13 @ 17:46)  Culture Results:   No growth at 48 hours (06-13 @ 17:46)      RADIOLOGY RESULTS:      Final Report (14 Jun 2018 16:28):    Culture grew 3 or more types of organisms which indicate    collection contamination; consider recollection only if clinically    indicated.    Spoke with Hugh Senior  06/14/2018 16:28:09    Urine culture negative           MEDICATIONS  (STANDING):  benzocaine 15 mG/menthol 3.6 mG Lozenge 1 Lozenge Oral every 6 hours  diphenhydrAMINE   Capsule 25 milliGRAM(s) Oral every 6 hours  hydrocortisone 0.5% Cream 1 Application(s) Topical two times a day  loratadine 10 milliGRAM(s) Oral daily  methylPREDNISolone sodium succinate Injectable 60 milliGRAM(s) IV Push every 6 hours  montelukast 10 milliGRAM(s) Oral at bedtime  multiple electrolytes Injection Type 1 1000 milliLiter(s) (100 mL/Hr) IV Continuous <Continuous>  ondansetron    Tablet 4 milliGRAM(s) Oral <User Schedule>    MEDICATIONS  (PRN):  acetaminophen   Tablet 650 milliGRAM(s) Oral every 6 hours PRN For Temp greater than 38 C (100.4 F)  acetaminophen   Tablet. 650 milliGRAM(s) Oral every 6 hours PRN Moderate Pain (4 - 6)  ALBUTerol    0.083% 2.5 milliGRAM(s) Nebulizer every 6 hours PRN Shortness of Breath and/or Wheezing Patient is a 38y old  Female who presents with a chief complaint of Chest pain, back pain & abdominal pain (14 Jun 2018 01:21)    Patient seen and examined at bedside. No acute distress and no acute overnight events. During the am rounds, no issues. Went back to see her about an hour later, diffuse hives and itching with no new medications administration.   She reports reduced redness, and no sob/wheezing.   She denies any fever, dysuria, hematuria.      ROS: No chest pain, palpitations, sob, difficulty breathing/cough, fevers/chills, abdominal pain, n/v, diarrhea/constipation, dysuria or increased urinary frequency. All other ROS negative     Vital Signs Last 24 Hrs  T(C): 37 (17 Jun 2018 07:36), Max: 37 (17 Jun 2018 07:36)  T(F): 98.6 (17 Jun 2018 07:36), Max: 98.6 (17 Jun 2018 07:36)  HR: 74 (17 Jun 2018 07:36) (70 - 88)  BP: 119/72 (17 Jun 2018 07:36) (109/62 - 123/75)  RR: 18 (17 Jun 2018 07:36) (18 - 18)  SpO2: 96% (17 Jun 2018 07:36) (96% - 98%)    Physical Examination:  General:  NAD at bedside;  mild erythema to chest  HEENT: PERRLA, clear conjunctiva;  Reduced lip and eyelid swelling. no facial flushing;  no swelling of the post pharynx;   RESP: CTAB, no wheezing/rales;   CV:S1, S2, RRR, no murmurs, rubs, or gallops  ABD: Soft, nondistended, nontender, normoactive bowel sounds; no suprapubic ttp;    EXT: no edema appreciated;   SKIN: Warm skin. Diffuse blanchable urticaria, reduced, present on arms, chest, legs;     LABS:                11.8   16.9  )-----------( 216      ( 17 Jun 2018 08:48 )             36.2     06-17    139  |  102  |  11.0  ----------------------------<  138<H>  3.8   |  25.0  |  0.49<L>    Ca    8.8      17 Jun 2018 08:48  Phos  3.4     06-17  Mg     2.1     06-16    TPro  6.2<L>  /  Alb  3.4  /  TBili  <0.2<L>  /  DBili  x   /  AST  19  /  ALT  53<H>  /  AlkPhos  81  06-17    LIVER FUNCTIONS - ( 17 Jun 2018 08:48 )  Alb: 3.4 g/dL / Pro: 6.2 g/dL / ALK PHOS: 81 U/L / ALT: 53 U/L / AST: 19 U/L / GGT: x                     MICROBIOLOGY/CULTURES:  Culture Results:   No growth (06-14 @ 22:10)  Culture Results:   Culture grew 3 or more types of organisms which indicate  collection contamination; consider recollection only if clinically  indicated.  Spoke with Hugh Senior  06/14/2018 16:28:09 (06-13 @ 19:16)  Culture Results:   No growth at 48 hours (06-13 @ 17:46)  Culture Results:   No growth at 48 hours (06-13 @ 17:46)      RADIOLOGY RESULTS:      Final Report (14 Jun 2018 16:28):    Culture grew 3 or more types of organisms which indicate    collection contamination; consider recollection only if clinically    indicated.    Spoke with Hugh Senior  06/14/2018 16:28:09    Urine culture negative           MEDICATIONS  (STANDING):  benzocaine 15 mG/menthol 3.6 mG Lozenge 1 Lozenge Oral every 6 hours  diphenhydrAMINE   Capsule 25 milliGRAM(s) Oral every 6 hours  hydrocortisone 0.5% Cream 1 Application(s) Topical two times a day  loratadine 10 milliGRAM(s) Oral daily  methylPREDNISolone sodium succinate Injectable 60 milliGRAM(s) IV Push every 6 hours  montelukast 10 milliGRAM(s) Oral at bedtime  multiple electrolytes Injection Type 1 1000 milliLiter(s) (100 mL/Hr) IV Continuous <Continuous>  ondansetron    Tablet 4 milliGRAM(s) Oral <User Schedule>    MEDICATIONS  (PRN):  acetaminophen   Tablet 650 milliGRAM(s) Oral every 6 hours PRN For Temp greater than 38 C (100.4 F)  acetaminophen   Tablet. 650 milliGRAM(s) Oral every 6 hours PRN Moderate Pain (4 - 6)  ALBUTerol    0.083% 2.5 milliGRAM(s) Nebulizer every 6 hours PRN Shortness of Breath and/or Wheezing

## 2018-06-17 NOTE — DISCHARGE NOTE ADULT - CARE PLAN
Principal Discharge DX:	Acute cystitis without hematuria  Goal:	Resolving  Assessment and plan of treatment:	During your hospital stay, you were diagnosed with a urinary tract infection. Please continue taking your medications as prescribed. Please follow up with your primary doctor within 1 week of discharge.  Secondary Diagnosis:	Angioedema  Secondary Diagnosis:	Viral upper respiratory tract infection  Assessment and plan of treatment:	During your hospital stay, you were diagnosed with a viral upper respiratory infection. Please continue taking your medications as prescribed. Please follow up with your primary doctor within 1 week of discharge.  Secondary Diagnosis:	Hypophosphatemia  Assessment and plan of treatment:	You were found to have a low phosphate level for which you were given phosphate supplement. Please be sure to follow up with your primary doctor to ensure resolution.  Secondary Diagnosis:	Hypocalcemia  Assessment and plan of treatment:	You were found to have a low calcium level for which you were given calcium supplement. Please be sure to follow up with your primary doctor to ensure resolution.  Secondary Diagnosis:	Asthma, unspecified asthma severity, unspecified whether complicated, unspecified whether persistent  Assessment and plan of treatment:	You have a history of asthma. Please be sure to follow up with your primary doctor as needed for this condition.  Secondary Diagnosis:	Depression, unspecified depression type  Assessment and plan of treatment:	During your hospital stay, you were found to have depression. Please follow up with a psychiatrist within 1 week of discharge for further evaluation. Principal Discharge DX:	Acute cystitis without hematuria  Goal:	Resolving  Assessment and plan of treatment:	During your hospital stay, you were diagnosed with a urinary tract infection. Please continue taking your medications as prescribed. Please follow up with your primary doctor within 1 week of discharge.  Secondary Diagnosis:	Angioedema  Assessment and plan of treatment:	During your hospital stay, you had an allergic reaction to possibly pork or a medication. Please follow up with an allergist within 1 week of discharge. You may contact the allergist listed below, Dr. Akash Faria.  Secondary Diagnosis:	Viral upper respiratory tract infection  Assessment and plan of treatment:	During your hospital stay, you were diagnosed with a viral upper respiratory infection. Please continue taking your medications as prescribed. Please follow up with your primary doctor within 1 week of discharge.  Secondary Diagnosis:	Hypophosphatemia  Assessment and plan of treatment:	You were found to have a low phosphate level for which you were given phosphate supplement. Please be sure to follow up with your primary doctor to ensure resolution.  Secondary Diagnosis:	Hypocalcemia  Assessment and plan of treatment:	You were found to have a low calcium level for which you were given calcium supplement. Please be sure to follow up with your primary doctor to ensure resolution.  Secondary Diagnosis:	Asthma, unspecified asthma severity, unspecified whether complicated, unspecified whether persistent  Assessment and plan of treatment:	You have a history of asthma. Please be sure to follow up with your primary doctor as needed for this condition.  Secondary Diagnosis:	Depression, unspecified depression type  Assessment and plan of treatment:	During your hospital stay, you were found to have depression. Please follow up with a psychiatrist within 1 week of discharge for further evaluation. Principal Discharge DX:	Acute cystitis without hematuria  Goal:	Resolving  Assessment and plan of treatment:	During your hospital stay, you were diagnosed with a urinary tract infection. You received 3 total days of antibiotics (2 days of rocephin and one day of ciprofloxacin) with negative culture and therefore need no further antibiotics   Please drink plenty of water   Make sure that you urinate after intercourse  Secondary Diagnosis:	Angioedema  Assessment and plan of treatment:	During your hospital stay, you had an allergic reaction to possibly pork or a medication. Please follow up with an allergist within 1 week of discharge. You may contact the allergist listed below, Dr. Akash Faria.  We are not sure what you are allergic to, however, do not take any of the following antibiotics: amoxicillin (penicillins), rocephin or cipro. We have sent allergy testing for you and recommend that you follow up with the allergist (Dr Faria) to go over the rest of your allergy panel   -In the meantime, if you get swelling in your lips, or face, please come back to the ER  Secondary Diagnosis:	Viral upper respiratory tract infection  Assessment and plan of treatment:	During your hospital stay, you were diagnosed with a viral upper respiratory infection.  Symptoms have since then resolved  Secondary Diagnosis:	Hypophosphatemia  Assessment and plan of treatment:	this level was repleted   we recommend that you get blood work done in a week of discharge  Secondary Diagnosis:	Hypocalcemia  Assessment and plan of treatment:	You were found to have a low calcium level for which you were given calcium supplement.   Follow up repeat blood work in a week after discharge  Secondary Diagnosis:	Asthma, unspecified asthma severity, unspecified whether complicated, unspecified whether persistent  Assessment and plan of treatment:	You have a history of asthma.   Continue taking home medications and follow up with Dr Faria who is an allergist who works with asthmatic patients closely  Secondary Diagnosis:	Depression, unspecified depression type  Assessment and plan of treatment:	During your hospital stay, you were found to have depression. Please follow up with a psychiatrist within 1 week of discharge for further evaluation.

## 2018-06-17 NOTE — CONSULT NOTE ADULT - SUBJECTIVE AND OBJECTIVE BOX
She presented to [facility] ER with bilateral back pain and abdominal pain, left greater than right. Evaluation included CT angiogram and abdominal CT Scan, which both were negative. Urinalysis revealed three or more organisms ( said to be contaminant ). She was evaluated for three UTI and Pyelonephritis. Chest X-Ray was negative. There is a history of bronchial asthma and was treated with PRN MDI. A repeat culture was negative. She was treated with Zithromax and Ceftriaxone. Later she was treated with Cipro. She developed angioedema of both lips and a urticarial rash with severe pruritis. No history of any allergic reactions to foods or medications. The rash and pruritis have not been improving. The treatment included Benadryl 25 MG IV push Q6h and Solu-Medrol 125 MG IV push. Other medications included Tylenol, Toradol for pain Sarna and topical Hydrocortisone .5 percent cream. To sort this out, I and Matilde Srinivasan (Resident) interviewed the patient at bedside with Marnie Rolle, a . PLAN: 1. Upon questioning, there was no history of any allergic to foods or medications (although pork was considered a possibility upon questioning). 2. Despite the history, Judi did have an allergic reaction in March 2018 at the [facility]( [facility]) and had a reaction to Amoxicillin-Clav (Augmentin) with a urticarial rash with severe itching. 3. She was told by, at that time, to never take Amoxicillin again. After developing the allergic reaction she went went to [facility] ER in March evaluated. 4. My impression: The angioedema is resolved and the urticarial rash and itching is improving. The angioedema and urticarial rash is most likely secondary to IV Ceftriaxone. The reason is a possible cross-reaction possibly up to ten percent chance between Ceftriaxone and Penicillin  ( a distant cousin). Could it be the Cipro or the Zithromax? Yes, but not as likely as the Ceftriaxone. 5. In view of this, a food reaction becomes less likely, although food allergy testing should be done in the future. A RAST Test was ordered for pork, chicken and beef. 6. The antibiotics were discontinued as well as the Montelukast,Tylenol,Toradol Sarna. The topical Hydrocortsone was increased to one percent for itching if needed. 7. The Benadryl was increased to 50 MG IV q6h, 7b Pepcid 20 MG IV q12h was ordered(H2 Blocker) H2 receptor sights may be involved in allergic reactions. 7c. Solu-Medrol to be continued but either discontinue or taper soon.7d Follow blood glucose. 7e. Immunologic blood work will be ordered by me. Will follow.

## 2018-06-17 NOTE — DISCHARGE NOTE ADULT - MEDICATION SUMMARY - MEDICATIONS TO TAKE
I will START or STAY ON the medications listed below when I get home from the hospital:    predniSONE 10 mg oral tablet  -- 6 tab(s) oral - by mouth once a day x 3 days  5 tab(s) oral - by mouth once a day x 3 days  4 tab(s) oral - by mouth once a day x 3 days  3 tab(s) oral - by mouth once a day x 3 days  2 tab(s) oral - by mouth once a day x 3 days  1 tab(s) oral - by mouth once a day x 3 days  -- It is very important that you take or use this exactly as directed.  Do not skip doses or discontinue unless directed by your doctor.  Obtain medical advice before taking any non-prescription drugs as some may affect the action of this medication.  Take with food or milk.    -- Indication: For Allergy    Ventolin HFA 90 mcg/inh inhalation aerosol  -- 2 puff(s) inhaled 4 times a day, As Needed  -- Indication: For Asthma I will START or STAY ON the medications listed below when I get home from the hospital:    predniSONE 10 mg oral tablet  -- 6 tab(s) oral - by mouth once a day x 3 days  5 tab(s) oral - by mouth once a day x 3 days  4 tab(s) oral - by mouth once a day x 3 days  3 tab(s) oral - by mouth once a day x 3 days  2 tab(s) oral - by mouth once a day x 3 days  1 tab(s) oral - by mouth once a day x 3 days  -- It is very important that you take or use this exactly as directed.  Do not skip doses or discontinue unless directed by your doctor.  Obtain medical advice before taking any non-prescription drugs as some may affect the action of this medication.  Take with food or milk.    -- Indication: For Allergy    Benadryl 50 mg oral capsule  -- 1 cap(s) by mouth every 6 hours, As Needed  -- Indication: For Allergy    Allegra 24 Hour Allergy oral tablet  -- 1 tab(s) by mouth every 12 hours   -- Avoid grapefruit and grapefruit juice while taking this medication.  Medication should be taken with plenty of water.  Obtain medical advice before taking any non-prescription drugs as some may affect the action of this medication.  Swallow whole.  Do not crush.    -- Indication: For Allergy    ZyrTEC 10 mg oral tablet  -- 1 tab(s) by mouth once a day (at bedtime)   -- May cause drowsiness.  Alcohol may intensify this effect.  Use care when operating dangerous machinery.  Obtain medical advice before taking any non-prescription drugs as some may affect the action of this medication.    -- Indication: For Allergy    Ventolin HFA 90 mcg/inh inhalation aerosol  -- 2 puff(s) inhaled 4 times a day, As Needed  -- Indication: For Asthma    montelukast 10 mg oral tablet  -- 1 tab(s) by mouth once a day (at bedtime)   -- It is very important that you take or use this exactly as directed.  Do not skip doses or discontinue unless directed by your doctor.    -- Indication: For Allergy I will START or STAY ON the medications listed below when I get home from the hospital:    predniSONE 10 mg oral tablet  -- 6 tab(s) oral - by mouth once a day x 3 days  5 tab(s) oral - by mouth once a day x 3 days  4 tab(s) oral - by mouth once a day x 3 days  3 tab(s) oral - by mouth once a day x 3 days  2 tab(s) oral - by mouth once a day x 3 days  1 tab(s) oral - by mouth once a day x 3 days  -- It is very important that you take or use this exactly as directed.  Do not skip doses or discontinue unless directed by your doctor.  Obtain medical advice before taking any non-prescription drugs as some may affect the action of this medication.  Take with food or milk.    -- Indication: For Allergy    Benadryl 50 mg oral capsule  -- 1 cap(s) by mouth every 6 hours, As Needed  -- Indication: For Allergy    Allegra 24 Hour Allergy oral tablet  -- 1 tab(s) by mouth every 12 hours   -- Avoid grapefruit and grapefruit juice while taking this medication.  Medication should be taken with plenty of water.  Obtain medical advice before taking any non-prescription drugs as some may affect the action of this medication.  Swallow whole.  Do not crush.    -- Indication: For Allergy    ZyrTEC 10 mg oral tablet  -- 1 tab(s) by mouth once a day (at bedtime)   -- May cause drowsiness.  Alcohol may intensify this effect.  Use care when operating dangerous machinery.  Obtain medical advice before taking any non-prescription drugs as some may affect the action of this medication.    -- Indication: For Allergy    Ventolin HFA 90 mcg/inh inhalation aerosol  -- 2 puff(s) inhaled 4 times a day, As Needed  -- Indication: For Asthma    montelukast 10 mg oral tablet  -- 1 tab(s) by mouth once a day (at bedtime)   -- It is very important that you take or use this exactly as directed.  Do not skip doses or discontinue unless directed by your doctor.    -- Indication: For Asthma

## 2018-06-18 VITALS
TEMPERATURE: 98 F | OXYGEN SATURATION: 97 % | RESPIRATION RATE: 18 BRPM | HEART RATE: 103 BPM | SYSTOLIC BLOOD PRESSURE: 113 MMHG | DIASTOLIC BLOOD PRESSURE: 63 MMHG

## 2018-06-18 LAB
ALBUMIN SERPL ELPH-MCNC: 3.4 G/DL — SIGNIFICANT CHANGE UP (ref 3.3–5.2)
ALP SERPL-CCNC: 72 U/L — SIGNIFICANT CHANGE UP (ref 40–120)
ALT FLD-CCNC: 46 U/L — HIGH
ANION GAP SERPL CALC-SCNC: 11 MMOL/L — SIGNIFICANT CHANGE UP (ref 5–17)
AST SERPL-CCNC: 14 U/L — SIGNIFICANT CHANGE UP
BASOPHILS # BLD AUTO: 0 K/UL — SIGNIFICANT CHANGE UP (ref 0–0.2)
BASOPHILS NFR BLD AUTO: 0.1 % — SIGNIFICANT CHANGE UP (ref 0–2)
BILIRUB SERPL-MCNC: <0.2 MG/DL — LOW (ref 0.4–2)
BUN SERPL-MCNC: 12 MG/DL — SIGNIFICANT CHANGE UP (ref 8–20)
CALCIUM SERPL-MCNC: 8.4 MG/DL — LOW (ref 8.6–10.2)
CCP IGG SERPL-ACNC: <8 UNITS — SIGNIFICANT CHANGE UP (ref 0–19)
CHLORIDE SERPL-SCNC: 103 MMOL/L — SIGNIFICANT CHANGE UP (ref 98–107)
CO2 SERPL-SCNC: 25 MMOL/L — SIGNIFICANT CHANGE UP (ref 22–29)
CREAT SERPL-MCNC: 0.49 MG/DL — LOW (ref 0.5–1.3)
CULTURE RESULTS: SIGNIFICANT CHANGE UP
CULTURE RESULTS: SIGNIFICANT CHANGE UP
DRVVT SCREEN TO CONFIRM RATIO: SIGNIFICANT CHANGE UP
EOSINOPHIL # BLD AUTO: 0 K/UL — SIGNIFICANT CHANGE UP (ref 0–0.5)
EOSINOPHIL NFR BLD AUTO: 0.1 % — SIGNIFICANT CHANGE UP (ref 0–6)
GLUCOSE SERPL-MCNC: 181 MG/DL — HIGH (ref 70–115)
HCT VFR BLD CALC: 33.7 % — LOW (ref 37–47)
HGB BLD-MCNC: 11 G/DL — LOW (ref 12–16)
LA NT DPL PPP QL: 31.8 SEC — SIGNIFICANT CHANGE UP
LYMPHOCYTES # BLD AUTO: 22.7 % — SIGNIFICANT CHANGE UP (ref 20–55)
LYMPHOCYTES # BLD AUTO: 3.3 K/UL — SIGNIFICANT CHANGE UP (ref 1–4.8)
MCHC RBC-ENTMCNC: 29.3 PG — SIGNIFICANT CHANGE UP (ref 27–31)
MCHC RBC-ENTMCNC: 32.6 G/DL — SIGNIFICANT CHANGE UP (ref 32–36)
MCV RBC AUTO: 89.9 FL — SIGNIFICANT CHANGE UP (ref 81–99)
MONOCYTES # BLD AUTO: 0.8 K/UL — SIGNIFICANT CHANGE UP (ref 0–0.8)
MONOCYTES NFR BLD AUTO: 5.7 % — SIGNIFICANT CHANGE UP (ref 3–10)
NEUTROPHILS # BLD AUTO: 10 K/UL — HIGH (ref 1.8–8)
NEUTROPHILS NFR BLD AUTO: 68.4 % — SIGNIFICANT CHANGE UP (ref 37–73)
NORMALIZED SCT PPP-RTO: 1.03 RATIO — SIGNIFICANT CHANGE UP (ref 0–1.16)
NORMALIZED SCT PPP-RTO: SIGNIFICANT CHANGE UP
PLATELET # BLD AUTO: 221 K/UL — SIGNIFICANT CHANGE UP (ref 150–400)
POTASSIUM SERPL-MCNC: 3.9 MMOL/L — SIGNIFICANT CHANGE UP (ref 3.5–5.3)
POTASSIUM SERPL-SCNC: 3.9 MMOL/L — SIGNIFICANT CHANGE UP (ref 3.5–5.3)
PROT SERPL-MCNC: 6 G/DL — LOW (ref 6.6–8.7)
RAPID RVP RESULT: SIGNIFICANT CHANGE UP
RBC # BLD: 3.75 M/UL — LOW (ref 4.4–5.2)
RBC # FLD: 14.1 % — SIGNIFICANT CHANGE UP (ref 11–15.6)
RF+CCP IGG SER-IMP: NEGATIVE — SIGNIFICANT CHANGE UP
SODIUM SERPL-SCNC: 139 MMOL/L — SIGNIFICANT CHANGE UP (ref 135–145)
SPECIMEN SOURCE: SIGNIFICANT CHANGE UP
SPECIMEN SOURCE: SIGNIFICANT CHANGE UP
WBC # BLD: 14.6 K/UL — HIGH (ref 4.8–10.8)
WBC # FLD AUTO: 14.6 K/UL — HIGH (ref 4.8–10.8)

## 2018-06-18 PROCEDURE — 96374 THER/PROPH/DIAG INJ IV PUSH: CPT | Mod: XU

## 2018-06-18 PROCEDURE — 80053 COMPREHEN METABOLIC PANEL: CPT

## 2018-06-18 PROCEDURE — 86038 ANTINUCLEAR ANTIBODIES: CPT

## 2018-06-18 PROCEDURE — 74176 CT ABD & PELVIS W/O CONTRAST: CPT

## 2018-06-18 PROCEDURE — 36415 COLL VENOUS BLD VENIPUNCTURE: CPT

## 2018-06-18 PROCEDURE — T1013: CPT

## 2018-06-18 PROCEDURE — 85027 COMPLETE CBC AUTOMATED: CPT

## 2018-06-18 PROCEDURE — 86215 DEOXYRIBONUCLEASE ANTIBODY: CPT

## 2018-06-18 PROCEDURE — 87633 RESP VIRUS 12-25 TARGETS: CPT

## 2018-06-18 PROCEDURE — 87086 URINE CULTURE/COLONY COUNT: CPT

## 2018-06-18 PROCEDURE — 71275 CT ANGIOGRAPHY CHEST: CPT

## 2018-06-18 PROCEDURE — 99239 HOSP IP/OBS DSCHRG MGMT >30: CPT

## 2018-06-18 PROCEDURE — 83880 ASSAY OF NATRIURETIC PEPTIDE: CPT

## 2018-06-18 PROCEDURE — 87040 BLOOD CULTURE FOR BACTERIA: CPT

## 2018-06-18 PROCEDURE — 87798 DETECT AGENT NOS DNA AMP: CPT

## 2018-06-18 PROCEDURE — 93970 EXTREMITY STUDY: CPT

## 2018-06-18 PROCEDURE — 96375 TX/PRO/DX INJ NEW DRUG ADDON: CPT | Mod: XU

## 2018-06-18 PROCEDURE — 86200 CCP ANTIBODY: CPT

## 2018-06-18 PROCEDURE — 85730 THROMBOPLASTIN TIME PARTIAL: CPT

## 2018-06-18 PROCEDURE — 71045 X-RAY EXAM CHEST 1 VIEW: CPT

## 2018-06-18 PROCEDURE — 81001 URINALYSIS AUTO W/SCOPE: CPT

## 2018-06-18 PROCEDURE — 87486 CHLMYD PNEUM DNA AMP PROBE: CPT

## 2018-06-18 PROCEDURE — 84484 ASSAY OF TROPONIN QUANT: CPT

## 2018-06-18 PROCEDURE — 86003 ALLG SPEC IGE CRUDE XTRC EA: CPT

## 2018-06-18 PROCEDURE — 84443 ASSAY THYROID STIM HORMONE: CPT

## 2018-06-18 PROCEDURE — 86235 NUCLEAR ANTIGEN ANTIBODY: CPT

## 2018-06-18 PROCEDURE — 83690 ASSAY OF LIPASE: CPT

## 2018-06-18 PROCEDURE — 99285 EMERGENCY DEPT VISIT HI MDM: CPT | Mod: 25

## 2018-06-18 PROCEDURE — 85610 PROTHROMBIN TIME: CPT

## 2018-06-18 PROCEDURE — 86160 COMPLEMENT ANTIGEN: CPT

## 2018-06-18 PROCEDURE — 84100 ASSAY OF PHOSPHORUS: CPT

## 2018-06-18 PROCEDURE — 86431 RHEUMATOID FACTOR QUANT: CPT

## 2018-06-18 PROCEDURE — 87581 M.PNEUMON DNA AMP PROBE: CPT

## 2018-06-18 PROCEDURE — 84702 CHORIONIC GONADOTROPIN TEST: CPT

## 2018-06-18 PROCEDURE — 83735 ASSAY OF MAGNESIUM: CPT

## 2018-06-18 PROCEDURE — 83605 ASSAY OF LACTIC ACID: CPT

## 2018-06-18 RX ORDER — FEXOFENADINE HCL 30 MG
1 TABLET ORAL
Qty: 60 | Refills: 0 | OUTPATIENT
Start: 2018-06-18 | End: 2018-07-17

## 2018-06-18 RX ORDER — MONTELUKAST 4 MG/1
1 TABLET, CHEWABLE ORAL
Qty: 30 | Refills: 0 | OUTPATIENT
Start: 2018-06-18 | End: 2018-07-17

## 2018-06-18 RX ORDER — DIPHENHYDRAMINE HCL 50 MG
1 CAPSULE ORAL
Qty: 0 | Refills: 0 | COMMUNITY

## 2018-06-18 RX ORDER — CETIRIZINE HYDROCHLORIDE 10 MG/1
1 TABLET ORAL
Qty: 30 | Refills: 0 | OUTPATIENT
Start: 2018-06-18 | End: 2018-07-17

## 2018-06-18 RX ADMIN — Medication 50 MILLIGRAM(S): at 11:57

## 2018-06-18 RX ADMIN — Medication 1 APPLICATION(S): at 17:13

## 2018-06-18 RX ADMIN — Medication 60 MILLIGRAM(S): at 06:16

## 2018-06-18 RX ADMIN — Medication 1 APPLICATION(S): at 06:21

## 2018-06-18 RX ADMIN — Medication 50 MILLIGRAM(S): at 17:12

## 2018-06-18 RX ADMIN — Medication 60 MILLIGRAM(S): at 17:19

## 2018-06-18 RX ADMIN — Medication 50 MILLIGRAM(S): at 06:16

## 2018-06-18 RX ADMIN — FAMOTIDINE 20 MILLIGRAM(S): 10 INJECTION INTRAVENOUS at 17:12

## 2018-06-18 RX ADMIN — FAMOTIDINE 20 MILLIGRAM(S): 10 INJECTION INTRAVENOUS at 06:16

## 2018-06-18 RX ADMIN — Medication 60 MILLIGRAM(S): at 11:57

## 2018-06-18 NOTE — CHART NOTE - NSCHARTNOTEFT_GEN_A_CORE
Patient was admitted to the hospital from 6/13/2018 to 6/18/2018. Please excuse her from work during this time. She may return to work at earliest convenience, with no limitations. Thank you.     Judith Gross MD

## 2018-06-18 NOTE — PROGRESS NOTE ADULT - ASSESSMENT
This is a 39 yo F  with a PMHx of asthma ( mild intermittent) who presented with c/o chest pain, sob and back pain, a/w UTI and no e/o pyelo.  Improving overall, but with episode of angioedema on the night of 6/14- unclear causative agent but likely to be due to pork consumption as she has had similar episodes in the past with pork consumption. On 6/16,she received ciprofloxacin to treat UTI and developed a diffuse allergic reaction with hives that were blanchable. Responded well to solumedrol and benadryl. On 6/17, patient developed diffuse hives with itching and no new agents given to her.     1. Allergic reaction:   - Likely due to Rocephin since documented allergy to Amoxicillin in the past.  -c/w solumedrol at current dose  -benadryl standing and Loratidine added   - Follow up immunology panel  - Allergy consultation appreciated.      2. UTI-   with initial presentation meeting sepsis criteria. Leucocytosis noted, mild increase with no recurrent fevers   -not likely to have had pyelo on admission, no fevers, cultures are all negative   -received 2 doses of rocephin and one of cipro. no further abx noted   -urine cx negative   -elevated lfts noted, likely 2/2 infection, will f/up     3. Headache - new onset, tylenol given. On admission was present   -resolved     4. URI - resolved, supportive care    5. Hypophosphatemia & hypocalcemia - repleted, f/up in am  -Oral calcium given x 2 days, f/up labs in am     6. Asthma - stable, prn nebs     7. Normocytic anemia - stable, Menstruating female. Monitor. No e/o acute bleed     8. Depression - since post partum (child is in pre k) - chronic at this point   No suicidal or homicidal ideation   -rec OP psych for anti depressant therapy.     9. Prophylactic measure - scds for dvt ppx now       Plan d/w patient at bedside with . She does not want us to call her , states she will speak to him herself. This is a 39 yo F  with a PMHx of asthma ( mild intermittent) who presented with c/o chest pain, sob and back pain, a/w UTI and no e/o pyelo.  Improving overall, but with episode of angioedema on the night of 6/14- unclear causative agent but likely to be due to pork consumption as she has had similar episodes in the past with pork consumption. On 6/16,she received ciprofloxacin to treat UTI and developed a diffuse allergic reaction with hives that were blanchable. Responded well to solumedrol and benadryl. On 6/17, patient developed diffuse hives with itching and no new agents given to her. Overnight, seen by Dr Faria and noted to have an allergy to amoxicllin, although not informed to us     1. Allergic reaction:   - Likely due to Rocephin since documented allergy to Amoxicillin in the past.  -change solumedrol to long taper on prednisone   -benadryl standing and Loratidine added   - Follow up immunology panel  - Allergy consultation appreciated.  -d/c today with non sedative anti histamines and f/up with Dr Faria       2. UTI-   with initial presentation meeting sepsis criteria. Leucocytosis noted and now likely due to steroids   -Sepsis resolved.   -not likely to have had pyelo on admission, no fevers now, cultures are all negative   -received 2 doses of rocephin and one of cipro. no further abx noted   -elevated lfts noted, likely 2/2 infection, will f/up     3. Headache - new onset, tylenol given. On admission was present   -resolved     4. URI - resolved, supportive care    5. Hypophosphatemia & hypocalcemia - repleted    6. Asthma - stable, prn nebs     7. Normocytic anemia - stable, Menstruating female. Monitor. No e/o acute bleed     8. Depression - since post partum (child is in pre k) - chronic at this point   No suicidal or homicidal ideation   -rec OP psych for anti depressant therapy.     9. Prophylactic measure - scds for dvt ppx now       Plan d/w patient at bedside with Armenian speaking resident. She again informed us that she does not want us to speak to her family. Also wants no info given to her father who does now know that she is in the hospital

## 2018-06-18 NOTE — PROGRESS NOTE ADULT - SUBJECTIVE AND OBJECTIVE BOX
Patient is a 38y old  Female who presents with a chief complaint of Chest pain, back pain & abdominal pain (14 Jun 2018 01:21)    Patient seen and examined at bedside. No acute distress and no acute overnight events.   She reports reduced redness, and no sob/wheezing.   She denies any fever, dysuria, hematuria.      ROS: No chest pain, palpitations, sob, difficulty breathing/cough, fevers/chills, abdominal pain, n/v, diarrhea/constipation, dysuria or increased urinary frequency. All other ROS negative     Vital Signs Last 24 Hrs  T(C): 36.7 (17 Jun 2018 23:30), Max: 36.8 (17 Jun 2018 15:38)  T(F): 98 (17 Jun 2018 23:30), Max: 98.3 (17 Jun 2018 15:38)  HR: 54 (17 Jun 2018 23:30) (54 - 75)  BP: 122/74 (17 Jun 2018 23:30) (122/74 - 123/77)  BP(mean): --  RR: 18 (17 Jun 2018 23:30) (17 - 18)  SpO2: 99% (17 Jun 2018 23:30) (98% - 99%)    Physical Examination:  General:  NAD at bedside;  mild erythema to chest  HEENT: PERRLA, clear conjunctiva;  Reduced lip and eyelid swelling. no facial flushing;  no swelling of the post pharynx;   RESP: CTAB, no wheezing/rales;   CV:S1, S2, RRR, no murmurs, rubs, or gallops  ABD: Soft, nondistended, nontender, normoactive bowel sounds; no suprapubic ttp;    EXT: no edema appreciated;   SKIN: Warm skin. Diffuse blanchable urticaria, reduced, present on arms, chest, legs;                           11.0   14.6  )-----------( 221      ( 18 Jun 2018 04:17 )             33.7   06-18    139  |  103  |  12.0  ----------------------------<  181<H>  3.9   |  25.0  |  0.49<L>    Ca    8.4<L>      18 Jun 2018 04:17  Phos  3.4     06-17  Mg     2.1     06-16    TPro  6.0<L>  /  Alb  3.4  /  TBili  <0.2<L>  /  DBili  x   /  AST  14  /  ALT  46<H>  /  AlkPhos  72  06-18    MEDICATIONS  (STANDING):  diphenhydrAMINE   Injectable 50 milliGRAM(s) IV Push every 6 hours  famotidine    Tablet 20 milliGRAM(s) Oral two times a day  hydrocortisone 1% Ointment 1 Application(s) Topical two times a day  methylPREDNISolone sodium succinate Injectable 60 milliGRAM(s) IV Push every 6 hours    MEDICATIONS  (PRN):  ALBUTerol    0.083% 2.5 milliGRAM(s) Nebulizer every 6 hours PRN Shortness of Breath and/or Wheezing Patient is a 38y old  Female who presents with a chief complaint of Chest pain, back pain & abdominal pain (14 Jun 2018 01:21)    Patient seen and examined at bedside. No acute distress and no acute overnight events.   She reports reduced redness, and no sob/wheezing.   She denies any fever, dysuria, hematuria.  Patient is eagerly asking to go home if possible. She missed her daughters preK graduation and would like to see her soon. She is asking for outpatient follow up    ROS: No chest pain, palpitations, sob, difficulty breathing/cough, fevers/chills, abdominal pain, n/v, diarrhea/constipation, dysuria or increased urinary frequency. All other ROS negative     Vital Signs Last 24 Hrs  T(C): 36.7 (17 Jun 2018 23:30), Max: 36.8 (17 Jun 2018 15:38)  T(F): 98 (17 Jun 2018 23:30), Max: 98.3 (17 Jun 2018 15:38)  HR: 54 (17 Jun 2018 23:30) (54 - 75)  BP: 122/74 (17 Jun 2018 23:30) (122/74 - 123/77)  RR: 18 (17 Jun 2018 23:30) (17 - 18)  SpO2: 99% (17 Jun 2018 23:30) (98% - 99%)    Physical Examination:  General:  NAD at bedside  HEENT: PERRLA, clear conjunctiva; no lip and eyelid swelling. no facial flushing;  no swelling of the post pharynx;   RESP: CTAB, no wheezing/rales;   CV:S1, S2, RRR, no murmurs, rubs, or gallops  ABD: Soft, nondistended, nontender, normoactive bowel sounds; no suprapubic ttp;    EXT: no edema appreciated;   SKIN: Warm skin. no noted urticaria any further noted                LABS:              11.0   14.6  )-----------( 221      ( 18 Jun 2018 04:17 )             33.7   06-18    139  |  103  |  12.0  ----------------------------<  181<H>  3.9   |  25.0  |  0.49<L>    Ca    8.4<L>      18 Jun 2018 04:17  Phos  3.4     06-17  Mg     2.1     06-16    TPro  6.0<L>  /  Alb  3.4  /  TBili  <0.2<L>  /  DBili  x   /  AST  14  /  ALT  46<H>  /  AlkPhos  72  06-18    MEDICATIONS  (STANDING):  diphenhydrAMINE   Injectable 50 milliGRAM(s) IV Push every 6 hours  famotidine    Tablet 20 milliGRAM(s) Oral two times a day  hydrocortisone 1% Ointment 1 Application(s) Topical two times a day  methylPREDNISolone sodium succinate Injectable 60 milliGRAM(s) IV Push every 6 hours    MEDICATIONS  (PRN):  ALBUTerol    0.083% 2.5 milliGRAM(s) Nebulizer every 6 hours PRN Shortness of Breath and/or Wheezing

## 2018-06-20 LAB
C1INH FUNCTIONAL/C1INH TOTAL MFR SERPL: 32 MG/DL — SIGNIFICANT CHANGE UP (ref 21–39)
ENA JO1 AB SER-ACNC: <0.2 AI — SIGNIFICANT CHANGE UP
ENA SCL70 AB SER-ACNC: <0.2 AI — SIGNIFICANT CHANGE UP
STREP DNASE B TITR SER: < 95 U/ML — SIGNIFICANT CHANGE UP

## 2018-06-21 LAB
ANA PAT FLD IF-IMP: ABNORMAL
ANA TITR SER: ABNORMAL
BEEF IGE QN: <0.1 KUA/L — SIGNIFICANT CHANGE UP
CHICKEN MEAT IGE QN: <0.1 KUA/L — SIGNIFICANT CHANGE UP
DEPRECATED BEEF IGE RAST QL: 0 — SIGNIFICANT CHANGE UP
DEPRECATED CHICKEN MEAT IGE RAST QL: 0 — SIGNIFICANT CHANGE UP
DEPRECATED PORK IGE RAST QL: 0 — SIGNIFICANT CHANGE UP
PORK IGE QN: <0.1 KUA/L — SIGNIFICANT CHANGE UP
TOTAL IGE SMQN RAST: SIGNIFICANT CHANGE UP

## 2019-04-03 NOTE — ED ADULT NURSE NOTE - NS ED NURSE IV DC DT
Medication:    Outpatient Medications Marked as Taking for the 4/3/19 encounter (Refill) with Denise A Brachmann, MD   Medication Sig Dispense Refill   • lamoTRIgine (LAMICTAL) 150 MG tablet Take 1/2 pill twice daily 30 tablet 5       Pharmacy has been verified.    [] Patient completely out of medication     Patient currently has follow up appointment scheduled    Message to Prescriber:    25-Mar-2018 02:51

## 2019-12-02 NOTE — ED ADULT NURSE NOTE - NSSISCREENINGQ5_ED_A_ED
PATIENT HAS BEEN SLOW TO COME AROUND. WHEN SHE FIRST WOKE UP SHE WAS NOT FOLLOWING COMMAND AND WAS ONLY MOVING LEFT ARM AND LEG. SHE KEPT REPEATING THE SAME THING, BUT I COULD NOT UNDERSTAND WHAT SHE WAS TRYING TO SAY. SHE IS NOW MOVING EVERYTHING AND FOLLOWING SOME COMMANDS. SPEECH IS STILL HARD TO UNDERSTAND, PROBABLY DUE TO NUMBNESS. DR. Natalee Herron. No

## 2021-09-03 ENCOUNTER — EMERGENCY (EMERGENCY)
Facility: HOSPITAL | Age: 41
LOS: 1 days | Discharge: HOME | End: 2021-09-03
Attending: EMERGENCY MEDICINE
Payer: MEDICAID

## 2021-09-03 VITALS
HEART RATE: 93 BPM | DIASTOLIC BLOOD PRESSURE: 83 MMHG | RESPIRATION RATE: 20 BRPM | SYSTOLIC BLOOD PRESSURE: 126 MMHG | OXYGEN SATURATION: 98 % | TEMPERATURE: 98 F

## 2021-09-03 VITALS — WEIGHT: 164.91 LBS | HEIGHT: 61 IN

## 2021-09-03 PROCEDURE — 99283 EMERGENCY DEPT VISIT LOW MDM: CPT

## 2021-09-03 PROCEDURE — 99282 EMERGENCY DEPT VISIT SF MDM: CPT

## 2021-09-03 NOTE — ED PROVIDER NOTE - NSICDXFAMILYHX_GEN_ALL_CORE_FT
FAMILY HISTORY:  Mother  Still living? Unknown  Family history of hypertension in mother, Age at diagnosis: Age Unknown    
Attending Only

## 2021-09-03 NOTE — ED PROVIDER NOTE - CHIEF COMPLAINT
[Use of Plain Language] : use of plain language [Adequate] : adequate [None] : none The patient is a 41y Female complaining of eye redness.

## 2021-09-03 NOTE — ED PROVIDER NOTE - ATTENDING CONTRIBUTION TO CARE
HPI: 42yo female with PMH     PE:  Gen: NAD  Head: NCAT  HEENT: Oral mucosa moist, normal conjunctiva  CV: RRR no murmurs, normal perfusion  Resp: CTA b/l, no wheezing, rales, rhonchi, no respiratory distress  GI: Abd Soft NTND  Neuro: No focal neuro deficits  MSK: FROM all 4 extremities, no deformity  Skin: No rash, no bruising  Psych: Normal affect    MDM:    I performed a history and physical exam of the patient and discussed their management with the advanced care provider. I reviewed the advanced care provider's note and agree with the documented findings and plan of care. My medical decision making and objective findings are found above. Pt with stye to R upper eyelid, dc home with warm compresses. Bettie Govea DO     I performed a history and physical exam of the patient and discussed their management with the advanced care provider. I reviewed the advanced care provider's note and agree with the documented findings and plan of care. My medical decision making and objective findings are found above.

## 2021-09-03 NOTE — ED PROVIDER NOTE - OBJECTIVE STATEMENT
Patient is a 41 year old female who presents c/o right eyelid swelling and pain. patient states symptoms started yesterday.  no pain to eye, no visual disturbances.

## 2021-09-03 NOTE — ED PROVIDER NOTE - PATIENT PORTAL LINK FT
You can access the FollowMyHealth Patient Portal offered by [facility] by registering at the following website: [email or web address]. By joining [employer]’s FollowMyHealth portal, you will also be able to view your health information using other applications (apps) compatible with our system.

## 2021-09-04 PROBLEM — E78.5 HYPERLIPIDEMIA, UNSPECIFIED: Chronic | Status: ACTIVE | Noted: 2018-06-14

## 2025-03-27 NOTE — PROGRESS NOTE ADULT - ATTENDING COMMENTS
Note addended where needed. Plan discussed with patient at bedside
Patient seen and examined at the bedside. Agree with the above history, physical, assessment, and plan with the necessary amendments/elaborations below:    clinically stable. 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 cont current tx, no indication for now to treat resistant organisms, pt without risk factors for this. tolerating current treatment. fu final cx results and tailor therapy based on findings. antiemetics before meals to aid in tolerance of po intake.
 used
Note addended where needed. Plan discussed with patient at bedside   Please see discharge papers for details.
Note addended where needed. Plan discussed with patient at bedside
Note addended where needed. Plan discussed with patient at bedside. Offered to call family, declined